# Patient Record
Sex: FEMALE | Race: WHITE | NOT HISPANIC OR LATINO | Employment: OTHER | URBAN - METROPOLITAN AREA
[De-identification: names, ages, dates, MRNs, and addresses within clinical notes are randomized per-mention and may not be internally consistent; named-entity substitution may affect disease eponyms.]

---

## 2019-02-04 RX ORDER — SIMVASTATIN 20 MG
20 TABLET ORAL NIGHTLY
Qty: 90 TABLET | Refills: 1 | Status: SHIPPED | OUTPATIENT
Start: 2019-02-04 | End: 2019-08-03 | Stop reason: SDUPTHER

## 2019-02-04 RX ORDER — GLIMEPIRIDE 4 MG/1
4 TABLET ORAL 2 TIMES DAILY
Qty: 180 TABLET | Refills: 1 | Status: SHIPPED | OUTPATIENT
Start: 2019-02-04 | End: 2019-08-03 | Stop reason: SDUPTHER

## 2019-02-04 RX ORDER — LISINOPRIL 10 MG/1
10 TABLET ORAL DAILY
Qty: 90 TABLET | Refills: 1 | Status: SHIPPED | OUTPATIENT
Start: 2019-02-04 | End: 2019-08-03 | Stop reason: SDUPTHER

## 2019-02-22 PROBLEM — M81.0 OSTEOPOROSIS: Status: ACTIVE | Noted: 2019-02-22

## 2019-02-22 PROBLEM — G62.9 PERIPHERAL NEUROPATHY: Status: ACTIVE | Noted: 2019-02-22

## 2019-02-22 PROBLEM — E78.2 MIXED HYPERLIPIDEMIA: Status: ACTIVE | Noted: 2019-02-22

## 2019-02-22 PROBLEM — E11.9 DIABETES MELLITUS, TYPE 2 (HCC): Status: ACTIVE | Noted: 2019-02-22

## 2019-02-22 PROBLEM — J30.9 ALLERGIC RHINITIS: Status: ACTIVE | Noted: 2019-02-22

## 2019-02-22 PROBLEM — E66.9 OBESITY: Status: ACTIVE | Noted: 2019-02-22

## 2019-02-22 PROBLEM — E55.9 VITAMIN D DEFICIENCY: Status: ACTIVE | Noted: 2019-02-22

## 2019-02-22 PROBLEM — G60.9 IDIOPATHIC PERIPHERAL NEUROPATHY: Status: ACTIVE | Noted: 2019-02-22

## 2019-02-27 ENCOUNTER — OFFICE VISIT (OUTPATIENT)
Dept: INTERNAL MEDICINE | Facility: CLINIC | Age: 64
End: 2019-02-27

## 2019-02-27 VITALS
HEIGHT: 60 IN | HEART RATE: 76 BPM | WEIGHT: 162 LBS | DIASTOLIC BLOOD PRESSURE: 68 MMHG | SYSTOLIC BLOOD PRESSURE: 118 MMHG | BODY MASS INDEX: 31.8 KG/M2

## 2019-02-27 DIAGNOSIS — E66.09 CLASS 1 OBESITY DUE TO EXCESS CALORIES WITH SERIOUS COMORBIDITY AND BODY MASS INDEX (BMI) OF 31.0 TO 31.9 IN ADULT: ICD-10-CM

## 2019-02-27 DIAGNOSIS — E78.2 MIXED HYPERLIPIDEMIA: ICD-10-CM

## 2019-02-27 DIAGNOSIS — E55.9 VITAMIN D DEFICIENCY: ICD-10-CM

## 2019-02-27 DIAGNOSIS — E11.42 TYPE 2 DIABETES MELLITUS WITH DIABETIC POLYNEUROPATHY, WITHOUT LONG-TERM CURRENT USE OF INSULIN (HCC): Primary | ICD-10-CM

## 2019-02-27 LAB
25(OH)D3 SERPL-MCNC: 31.1 NG/ML
ALBUMIN SERPL-MCNC: 4.89 G/DL (ref 3.2–4.8)
ALBUMIN/GLOB SERPL: 2.4 G/DL (ref 1.5–2.5)
ALP SERPL-CCNC: 96 U/L (ref 25–100)
ALT SERPL W P-5'-P-CCNC: 42 U/L (ref 7–40)
ANION GAP SERPL CALCULATED.3IONS-SCNC: 10 MMOL/L (ref 3–11)
ARTICHOKE IGE QN: 92 MG/DL (ref 0–130)
AST SERPL-CCNC: 30 U/L (ref 0–33)
BILIRUB SERPL-MCNC: 1 MG/DL (ref 0.3–1.2)
BUN BLD-MCNC: 16 MG/DL (ref 9–23)
BUN/CREAT SERPL: 20.8 (ref 7–25)
CALCIUM SPEC-SCNC: 9.7 MG/DL (ref 8.7–10.4)
CHLORIDE SERPL-SCNC: 99 MMOL/L (ref 99–109)
CHOLEST SERPL-MCNC: 191 MG/DL (ref 0–200)
CO2 SERPL-SCNC: 28 MMOL/L (ref 20–31)
CREAT BLD-MCNC: 0.77 MG/DL (ref 0.6–1.3)
GFR SERPL CREATININE-BSD FRML MDRD: 76 ML/MIN/1.73
GLOBULIN UR ELPH-MCNC: 2 GM/DL
GLUCOSE BLD-MCNC: 165 MG/DL (ref 70–100)
HBA1C MFR BLD: 7.2 % (ref 4.8–5.6)
HDLC SERPL-MCNC: 44 MG/DL (ref 40–60)
POTASSIUM BLD-SCNC: 4.6 MMOL/L (ref 3.5–5.5)
PROT SERPL-MCNC: 6.9 G/DL (ref 5.7–8.2)
SODIUM BLD-SCNC: 137 MMOL/L (ref 132–146)
TRIGL SERPL-MCNC: 269 MG/DL (ref 0–150)

## 2019-02-27 PROCEDURE — 36415 COLL VENOUS BLD VENIPUNCTURE: CPT | Performed by: INTERNAL MEDICINE

## 2019-02-27 PROCEDURE — 82306 VITAMIN D 25 HYDROXY: CPT | Performed by: INTERNAL MEDICINE

## 2019-02-27 PROCEDURE — 99214 OFFICE O/P EST MOD 30 MIN: CPT | Performed by: INTERNAL MEDICINE

## 2019-02-27 PROCEDURE — 80053 COMPREHEN METABOLIC PANEL: CPT | Performed by: INTERNAL MEDICINE

## 2019-02-27 PROCEDURE — 83036 HEMOGLOBIN GLYCOSYLATED A1C: CPT | Performed by: INTERNAL MEDICINE

## 2019-02-27 PROCEDURE — 80061 LIPID PANEL: CPT | Performed by: INTERNAL MEDICINE

## 2019-02-27 RX ORDER — OMEPRAZOLE 20 MG/1
20 CAPSULE, DELAYED RELEASE ORAL EVERY OTHER DAY
COMMUNITY

## 2019-02-27 RX ORDER — ERGOCALCIFEROL (VITAMIN D2) 50 MCG
1 CAPSULE ORAL DAILY
COMMUNITY

## 2019-02-27 RX ORDER — PHENOL 1.4 %
600 AEROSOL, SPRAY (ML) MUCOUS MEMBRANE DAILY
COMMUNITY

## 2019-02-27 RX ORDER — OMEGA-3S/DHA/EPA/FISH OIL 1000-1400
1 CAPSULE,DELAYED RELEASE (ENTERIC COATED) ORAL DAILY
COMMUNITY

## 2019-02-27 RX ORDER — GLUCOSAMINE/CHONDR SU A SOD 750-600 MG
1 TABLET ORAL DAILY
COMMUNITY

## 2019-02-27 RX ORDER — ASPIRIN 81 MG/1
81 TABLET ORAL DAILY
COMMUNITY

## 2019-02-27 RX ORDER — CETIRIZINE HYDROCHLORIDE 10 MG/1
10 TABLET ORAL DAILY
COMMUNITY

## 2019-02-27 RX ORDER — VITAMIN E 268 MG
400 CAPSULE ORAL DAILY
COMMUNITY

## 2019-02-27 NOTE — PROGRESS NOTES
[facility]     Rachel Dale  1955   [id number]      Patient Care Team:  Jer Richardson MD as PCP - General (Internal Medicine)    Chief Complaint::   Chief Complaint   Patient presents with   • Hyperlipidemia   • Allergic Rhinitis   • Vitamin D Deficiency   • Diabetes   • Osteoporosis        HPI  Ms Dale is now 63.  She comes in for follow-up of her diabetes, neuropathy, hyperlipidemia, and obesity.  Since January, except on rainy days, she has walked 30-50 minutes a day.  She feels better.  Not eating better.  She has retired now so she has more time.    Chronic Conditions:        Patient Active Problem List   Diagnosis   • Allergic rhinitis   • Diabetes mellitus, type 2 (CMS/[facility])   • Peripheral neuropathy   • Idiopathic peripheral neuropathy   • Mixed hyperlipidemia   • Obesity   • Osteoporosis   • Vitamin D deficiency        No past medical history on file.    Past Surgical History:   Procedure Laterality Date   • BREAST SURGERY      BREAST REDUCTION    • CHOLECYSTECTOMY  2009       Family History   Problem Relation Age of Onset   • Hypertension Mother    • Stroke Father    • Colon cancer Brother 74   • Breast cancer Maternal Aunt    • Coronary artery disease Maternal Grandfather        Social History     Socioeconomic History   • Marital status:      Spouse name: Not on file   • Number of children: Not on file   • Years of education: Not on file   • Highest education level: Not on file   Social Needs   • Financial resource strain: Not on file   • Food insecurity - worry: Not on file   • Food insecurity - inability: Not on file   • Transportation needs - medical: Not on file   • Transportation needs - non-medical: Not on file   Occupational History   • Not on file   Tobacco Use   • Smoking status: Never Smoker   Substance and Sexual Activity   • Alcohol use: Not on file   • Drug use: Not on file   • Sexual activity: Not on file   Other Topics Concern   • Not on file   Social  History Narrative   • Not on file       Allergies   Allergen Reactions   • Sulfa Antibiotics Other (See Comments)     OTHER         Current Outpatient Medications:   •  aspirin 81 MG EC tablet, Take 81 mg by mouth Daily., Disp: , Rfl:   •  calcium carbonate (OS-ALTAGRACIA) 600 MG tablet, Take 600 mg by mouth Daily., Disp: , Rfl:   •  cetirizine (zyrTEC) 10 MG tablet, Take 10 mg by mouth Daily., Disp: , Rfl:   •  glimepiride (AMARYL) 4 MG tablet, Take 1 tablet by mouth 2 (Two) Times a Day., Disp: 180 tablet, Rfl: 1  •  lisinopril (PRINIVIL,ZESTRIL) 10 MG tablet, Take 1 tablet by mouth Daily., Disp: 90 tablet, Rfl: 1  •  Lutein-Zeaxanthin 25-5 MG capsule, Take 1 capsule by mouth Daily., Disp: , Rfl:   •  metFORMIN (GLUCOPHAGE) 1000 MG tablet, Take 1 tablet by mouth 2 (Two) Times a Day With Meals., Disp: 180 tablet, Rfl: 1  •  Omega-3 Fatty Acids (FISH OIL ULTRA) 1400 MG capsule, Take 1 capsule by mouth Daily., Disp: , Rfl:   •  omeprazole (priLOSEC) 20 MG capsule, Take 20 mg by mouth Daily., Disp: , Rfl:   •  simvastatin (ZOCOR) 20 MG tablet, Take 1 tablet by mouth Every Night., Disp: 90 tablet, Rfl: 1  •  Vitamin D, Ergocalciferol, 2000 units capsule, Take 1 capsule by mouth Daily., Disp: , Rfl:   •  vitamin E 400 UNIT capsule, Take 400 Units by mouth Daily., Disp: , Rfl:     Review of Systems   Constitutional: Negative.  Negative for chills, fatigue and fever.   HENT: Negative for congestion, ear pain and sinus pressure.    Respiratory: Negative.  Negative for cough, chest tightness, shortness of breath and wheezing.    Cardiovascular: Negative.  Negative for chest pain and palpitations.   Gastrointestinal: Negative for abdominal pain, blood in stool, constipation and diarrhea.   Skin: Negative for color change.   Allergic/Immunologic: Negative for environmental allergies.   Neurological: Negative for dizziness, speech difficulty and headache.   Psychiatric/Behavioral: Negative for decreased concentration. The patient is  "not nervous/anxious.         Vital Signs  Vitals:    02/27/19 0819   BP: 118/68   BP Location: Left arm   Patient Position: Sitting   Cuff Size: Adult   Pulse: 76   Weight: 73.5 kg (162 lb)   Height: 152.4 cm (60\")       Physical Exam   Constitutional: She is oriented to person, place, and time. She appears well-developed and well-nourished. She appears overweight.   HENT:   Head: Normocephalic and atraumatic.   Cardiovascular: Normal rate, regular rhythm and normal heart sounds.   No murmur heard.  Pulmonary/Chest: Effort normal and breath sounds normal.   Neurological: She is alert and oriented to person, place, and time.   Psychiatric: She has a normal mood and affect.   Vitals reviewed.     Procedures      Assessment/Plan:    #1 diabetic neuropathy, A1c pending.  Foot exam was done in August 2018, eye exam was performed in May 2018.  I am very happy with her current exercise regimen, and only suggested that she try to make a small reduction in simple carbohydrates.  #2 hyperlipidemia results pending  #3 vitamin D deficiency results pending  #4 obesity, BMI today is 31.  Exercise is good, dietary adjustment as discussed above.    Plan of care reviewed with patient at the conclusion of today's visit. Education was provided regarding diagnosis, management, and any prescribed or recommended OTC medications.Patient verbalizes understanding of and agreement with management plan.         Jer Richardson MD           "

## 2019-08-05 RX ORDER — LISINOPRIL 10 MG/1
TABLET ORAL
Qty: 90 TABLET | Refills: 1 | Status: SHIPPED | OUTPATIENT
Start: 2019-08-05 | End: 2020-03-06

## 2019-08-05 RX ORDER — GLIMEPIRIDE 4 MG/1
TABLET ORAL
Qty: 180 TABLET | Refills: 1 | Status: SHIPPED | OUTPATIENT
Start: 2019-08-05 | End: 2020-02-14 | Stop reason: SDUPTHER

## 2019-08-05 RX ORDER — SIMVASTATIN 20 MG
TABLET ORAL
Qty: 90 TABLET | Refills: 1 | Status: SHIPPED | OUTPATIENT
Start: 2019-08-05 | End: 2019-11-20 | Stop reason: SDUPTHER

## 2019-08-29 ENCOUNTER — OFFICE VISIT (OUTPATIENT)
Dept: INTERNAL MEDICINE | Facility: CLINIC | Age: 64
End: 2019-08-29

## 2019-08-29 ENCOUNTER — LAB (OUTPATIENT)
Dept: LAB | Facility: HOSPITAL | Age: 64
End: 2019-08-29

## 2019-08-29 VITALS
WEIGHT: 157.75 LBS | SYSTOLIC BLOOD PRESSURE: 128 MMHG | HEIGHT: 60 IN | TEMPERATURE: 97.9 F | HEART RATE: 72 BPM | BODY MASS INDEX: 30.97 KG/M2 | DIASTOLIC BLOOD PRESSURE: 80 MMHG

## 2019-08-29 DIAGNOSIS — E55.9 VITAMIN D DEFICIENCY: ICD-10-CM

## 2019-08-29 DIAGNOSIS — E11.42 TYPE 2 DIABETES MELLITUS WITH DIABETIC POLYNEUROPATHY, WITHOUT LONG-TERM CURRENT USE OF INSULIN (HCC): ICD-10-CM

## 2019-08-29 DIAGNOSIS — I10 BENIGN ESSENTIAL HYPERTENSION: ICD-10-CM

## 2019-08-29 DIAGNOSIS — E66.09 CLASS 1 OBESITY DUE TO EXCESS CALORIES WITH SERIOUS COMORBIDITY AND BODY MASS INDEX (BMI) OF 31.0 TO 31.9 IN ADULT: ICD-10-CM

## 2019-08-29 DIAGNOSIS — E78.2 MIXED HYPERLIPIDEMIA: ICD-10-CM

## 2019-08-29 DIAGNOSIS — Z00.00 PREVENTATIVE HEALTH CARE: Primary | ICD-10-CM

## 2019-08-29 LAB
25(OH)D3 SERPL-MCNC: 38.2 NG/ML (ref 30–100)
ALBUMIN SERPL-MCNC: 5 G/DL (ref 3.5–5.2)
ALBUMIN UR-MCNC: <1.2 MG/DL
ALBUMIN/GLOB SERPL: 1.9 G/DL
ALP SERPL-CCNC: 79 U/L (ref 39–117)
ALT SERPL W P-5'-P-CCNC: 25 U/L (ref 1–33)
ANION GAP SERPL CALCULATED.3IONS-SCNC: 13.5 MMOL/L (ref 5–15)
AST SERPL-CCNC: 25 U/L (ref 1–32)
BASOPHILS # BLD AUTO: 0.04 10*3/MM3 (ref 0–0.2)
BASOPHILS NFR BLD AUTO: 0.5 % (ref 0–1.5)
BILIRUB SERPL-MCNC: 0.3 MG/DL (ref 0.2–1.2)
BUN BLD-MCNC: 14 MG/DL (ref 8–23)
BUN/CREAT SERPL: 17.9 (ref 7–25)
CALCIUM SPEC-SCNC: 10.2 MG/DL (ref 8.6–10.5)
CHLORIDE SERPL-SCNC: 97 MMOL/L (ref 98–107)
CHOLEST SERPL-MCNC: 170 MG/DL (ref 0–200)
CO2 SERPL-SCNC: 27.5 MMOL/L (ref 22–29)
CREAT BLD-MCNC: 0.78 MG/DL (ref 0.57–1)
CREAT UR-MCNC: 63.5 MG/DL
DEPRECATED RDW RBC AUTO: 44.9 FL (ref 37–54)
EOSINOPHIL # BLD AUTO: 0.2 10*3/MM3 (ref 0–0.4)
EOSINOPHIL NFR BLD AUTO: 2.4 % (ref 0.3–6.2)
ERYTHROCYTE [DISTWIDTH] IN BLOOD BY AUTOMATED COUNT: 13.5 % (ref 12.3–15.4)
GFR SERPL CREATININE-BSD FRML MDRD: 75 ML/MIN/1.73
GLOBULIN UR ELPH-MCNC: 2.6 GM/DL
GLUCOSE BLD-MCNC: 133 MG/DL (ref 65–99)
HBA1C MFR BLD: 6.9 % (ref 4.8–5.6)
HCT VFR BLD AUTO: 42.8 % (ref 34–46.6)
HDLC SERPL-MCNC: 50 MG/DL (ref 40–60)
HGB BLD-MCNC: 13.4 G/DL (ref 12–15.9)
IMM GRANULOCYTES # BLD AUTO: 0.03 10*3/MM3 (ref 0–0.05)
IMM GRANULOCYTES NFR BLD AUTO: 0.4 % (ref 0–0.5)
LDLC SERPL CALC-MCNC: 75 MG/DL (ref 0–100)
LDLC/HDLC SERPL: 1.5 {RATIO}
LYMPHOCYTES # BLD AUTO: 3.22 10*3/MM3 (ref 0.7–3.1)
LYMPHOCYTES NFR BLD AUTO: 39 % (ref 19.6–45.3)
MCH RBC QN AUTO: 28.2 PG (ref 26.6–33)
MCHC RBC AUTO-ENTMCNC: 31.3 G/DL (ref 31.5–35.7)
MCV RBC AUTO: 89.9 FL (ref 79–97)
MICROALBUMIN/CREAT UR: NORMAL MG/G{CREAT}
MONOCYTES # BLD AUTO: 0.55 10*3/MM3 (ref 0.1–0.9)
MONOCYTES NFR BLD AUTO: 6.7 % (ref 5–12)
NEUTROPHILS # BLD AUTO: 4.21 10*3/MM3 (ref 1.7–7)
NEUTROPHILS NFR BLD AUTO: 51 % (ref 42.7–76)
NRBC BLD AUTO-RTO: 0 /100 WBC (ref 0–0.2)
PLATELET # BLD AUTO: 355 10*3/MM3 (ref 140–450)
PMV BLD AUTO: 9.7 FL (ref 6–12)
POTASSIUM BLD-SCNC: 4.4 MMOL/L (ref 3.5–5.2)
PROT SERPL-MCNC: 7.6 G/DL (ref 6–8.5)
RBC # BLD AUTO: 4.76 10*6/MM3 (ref 3.77–5.28)
SODIUM BLD-SCNC: 138 MMOL/L (ref 136–145)
TRIGL SERPL-MCNC: 226 MG/DL (ref 0–150)
VLDLC SERPL-MCNC: 45.2 MG/DL (ref 5–40)
WBC NRBC COR # BLD: 8.25 10*3/MM3 (ref 3.4–10.8)

## 2019-08-29 PROCEDURE — 82570 ASSAY OF URINE CREATININE: CPT

## 2019-08-29 PROCEDURE — 85025 COMPLETE CBC W/AUTO DIFF WBC: CPT

## 2019-08-29 PROCEDURE — 82306 VITAMIN D 25 HYDROXY: CPT

## 2019-08-29 PROCEDURE — 80053 COMPREHEN METABOLIC PANEL: CPT

## 2019-08-29 PROCEDURE — 82043 UR ALBUMIN QUANTITATIVE: CPT

## 2019-08-29 PROCEDURE — 80061 LIPID PANEL: CPT

## 2019-08-29 PROCEDURE — 83036 HEMOGLOBIN GLYCOSYLATED A1C: CPT

## 2019-08-29 PROCEDURE — 99396 PREV VISIT EST AGE 40-64: CPT | Performed by: INTERNAL MEDICINE

## 2019-08-29 NOTE — PROGRESS NOTES
[facility]     Rachel Dale  1955   [id number]      Patient Care Team:  Jer Richardson MD as PCP - General (Internal Medicine)    Chief Complaint::   Chief Complaint   Patient presents with   • Annual Exam     fasting   • Diabetes   • Hyperlipidemia        HPI  Ms. Dale is now 64.  She comes in for follow-up of her diabetes, hyperlipidemia, hypertension vitamin D deficiency and for annual examination.  She is doing very well.  She has lost weight by reducing carbohydrates.  She walks 2 miles a day most days.  Her glucoses have ranged between 70 and 130.  She has had 1 or 2 episodes of hypoglycemia.  There is no chest pain or dyspnea.    Chronic Conditions:      Patient Active Problem List   Diagnosis   • Allergic rhinitis   • Diabetes mellitus, type 2 (CMS/HCC)   • Peripheral neuropathy   • Idiopathic peripheral neuropathy   • Mixed hyperlipidemia   • Obesity   • Osteoporosis   • Vitamin D deficiency        History reviewed. No pertinent past medical history.    Past Surgical History:   Procedure Laterality Date   • BREAST SURGERY      BREAST REDUCTION    • CHOLECYSTECTOMY  2009       Family History   Problem Relation Age of Onset   • Hypertension Mother    • Stroke Father    • Colon cancer Brother 74   • Breast cancer Maternal Aunt    • Coronary artery disease Maternal Grandfather        Social History     Socioeconomic History   • Marital status:      Spouse name: Not on file   • Number of children: Not on file   • Years of education: Not on file   • Highest education level: Not on file   Tobacco Use   • Smoking status: Never Smoker   • Smokeless tobacco: Never Used   Substance and Sexual Activity   • Alcohol use: Yes     Alcohol/week: 0.6 oz     Types: 1 Glasses of wine per week     Frequency: Never     Comment: yearly   • Drug use: Defer   • Sexual activity: Defer       Allergies   Allergen Reactions   • Sulfa Antibiotics Other (See Comments)     OTHER         Current  Outpatient Medications:   •  aspirin 81 MG EC tablet, Take 81 mg by mouth Daily., Disp: , Rfl:   •  calcium carbonate (OS-ALTAGRACIA) 600 MG tablet, Take 600 mg by mouth Daily., Disp: , Rfl:   •  cetirizine (zyrTEC) 10 MG tablet, Take 10 mg by mouth Daily., Disp: , Rfl:   •  glimepiride (AMARYL) 4 MG tablet, TAKE 1 TABLET TWICE A DAY, Disp: 180 tablet, Rfl: 1  •  lisinopril (PRINIVIL,ZESTRIL) 10 MG tablet, TAKE 1 TABLET DAILY, Disp: 90 tablet, Rfl: 1  •  Lutein-Zeaxanthin 25-5 MG capsule, Take 1 capsule by mouth Daily., Disp: , Rfl:   •  metFORMIN (GLUCOPHAGE) 1000 MG tablet, TAKE 1 TABLET TWICE A DAY WITH MEALS, Disp: 180 tablet, Rfl: 1  •  Omega-3 Fatty Acids (FISH OIL ULTRA) 1400 MG capsule, Take 1 capsule by mouth Daily., Disp: , Rfl:   •  omeprazole (priLOSEC) 20 MG capsule, Take 20 mg by mouth Daily., Disp: , Rfl:   •  simvastatin (ZOCOR) 20 MG tablet, TAKE 1 TABLET EVERY NIGHT, Disp: 90 tablet, Rfl: 1  •  Vitamin D, Ergocalciferol, 2000 units capsule, Take 1 capsule by mouth Daily., Disp: , Rfl:   •  vitamin E 400 UNIT capsule, Take 400 Units by mouth Daily., Disp: , Rfl:     Immunization History   Administered Date(s) Administered   • Tdap 03/25/2009        Health Maintenance Due   Topic Date Due   • MAMMOGRAM  1955   • URINE MICROALBUMIN  1955   • PNEUMOCOCCAL VACCINE (19-64 MEDIUM RISK) (1 of 1 - PPSV23) 04/27/1976   • ZOSTER VACCINE (1 of 2) 04/27/2007   • HEPATITIS C SCREENING  02/01/2019   • DIABETIC FOOT EXAM  02/01/2019   • PAP SMEAR  02/01/2019   • DXA SCAN  02/27/2019   • TDAP/TD VACCINES (2 - Td) 03/25/2019   • ANNUAL PHYSICAL  08/21/2019   • HEMOGLOBIN A1C  08/27/2019   • INFLUENZA VACCINE  08/01/2019        Review of Systems   Constitutional: Negative for chills, fatigue and fever.   HENT: Positive for sinus pressure. Negative for congestion and ear pain.    Respiratory: Negative for cough, chest tightness, shortness of breath and wheezing.    Cardiovascular: Negative for chest pain and  "palpitations.   Gastrointestinal: Negative for abdominal pain, blood in stool and constipation.   Skin: Negative for color change.   Allergic/Immunologic: Negative for environmental allergies.   Neurological: Positive for headache. Negative for dizziness and speech difficulty.   Psychiatric/Behavioral: Negative for decreased concentration. The patient is not nervous/anxious.         Vital Signs  Vitals:    08/29/19 0804   BP: 128/80   BP Location: Left arm   Patient Position: Sitting   Cuff Size: Adult   Pulse: 72   Temp: 97.9 °F (36.6 °C)   TempSrc: Temporal   Weight: 71.6 kg (157 lb 12 oz)   Height: 151.5 cm (59.65\")   PainSc: 0-No pain       Physical Exam   Constitutional: She is oriented to person, place, and time. She appears well-developed and well-nourished.   HENT:   Head: Normocephalic and atraumatic.   Right Ear: External ear normal.   Left Ear: External ear normal.   Nose: Nose normal.   Mouth/Throat: Oropharynx is clear and moist. No oropharyngeal exudate.   Eyes: Conjunctivae and EOM are normal. Pupils are equal, round, and reactive to light.   Neck: Normal range of motion. Neck supple. No JVD present. No thyromegaly present.   Cardiovascular: Normal rate, regular rhythm, normal heart sounds and intact distal pulses. Exam reveals no gallop and no friction rub.   No murmur heard.  Pulmonary/Chest: Effort normal and breath sounds normal. No respiratory distress. She has no wheezes. She has no rales. She exhibits no tenderness.   Abdominal: Soft. Bowel sounds are normal. She exhibits no distension and no mass. There is no tenderness. There is no rebound and no guarding. No hernia.   Musculoskeletal: Normal range of motion. She exhibits no edema or tenderness.    Rachel had a diabetic foot exam performed today.   During the foot exam she had a monofilament test performed (Sensation intact in both feet to monofilament).  Vascular Status -  Her right foot exhibits normal foot vasculature  and no edema. Her " left foot exhibits normal foot vasculature  and no edema.  Skin Integrity  -  Her right foot skin is intact.Her left foot skin is intact..  Lymphadenopathy:     She has no cervical adenopathy.   Neurological: She is alert and oriented to person, place, and time. She displays normal reflexes. No cranial nerve deficit or sensory deficit. She exhibits normal muscle tone. Coordination normal.   Skin: Skin is warm and dry. No rash noted. No erythema.   Psychiatric: She has a normal mood and affect. Her behavior is normal. Judgment and thought content normal.   Nursing note and vitals reviewed.     Procedures     Fall Risk Screen:  STEADI Fall Risk Assessment has not been completed.    Health Habits and Functional and Cognitive Screening:  No flowsheet data found.    Depression [place]  PHQ-9 Total Score: 0     ACE III MINI             Assessment/Plan:    Rachel was seen today for annual exam, diabetes and hyperlipidemia.    Diagnoses and all orders for this visit:    Preventative health care    Benign essential hypertension  -     CBC & Differential; Future  -     Comprehensive Metabolic Panel; Future    Type 2 diabetes mellitus with diabetic polyneuropathy, without long-term current use of insulin (CMS/[facility])  -     Comprehensive Metabolic Panel; Future  -     Hemoglobin A1c; Future  -     Microalbumin / Creatinine Urine Ratio - Urine, Clean Catch; Future    Class 1 obesity due to excess calories with serious comorbidity and body mass index (BMI) of 31.0 to 31.9 in adult    Vitamin D deficiency  -     Vitamin D 25 Hydroxy; Future    Mixed hyperlipidemia  -     Lipid Panel; Future    Plan    She is taking much better care of herself with good exercise and dietary habits having lost 5 pounds.  I have encouraged her to keep that up.  Colonoscopy was performed 9/20/2017, follow-up at 5 years.  Mammography was performed last week.  She goes yearly to  for cervical cancer screening.    Blood pressure is controlled on  lisinopril.    A1c is pending.  She will continue glimepiride and metformin.  If she has further hypoglycemic episodes would reduce and possibly discontinue glimepiride as she loses weight.  Eye exam was done in June foot exam was performed today.    BMI is 31, but again she is down 5 pounds.  Encouraged her to keep up the good work.    Lipid panel is pending, continue statin.        Labs  Results for orders placed or performed in visit on 02/27/19   Hemoglobin A1c   Result Value Ref Range    Hemoglobin A1C 7.20 (H) 4.80 - 5.60 %   Comprehensive Metabolic Panel   Result Value Ref Range    Glucose 165 (H) 70 - 100 mg/dL    BUN 16 9 - 23 mg/dL    Creatinine 0.77 0.60 - 1.30 mg/dL    Sodium 137 132 - 146 mmol/L    Potassium 4.6 3.5 - 5.5 mmol/L    Chloride 99 99 - 109 mmol/L    CO2 28.0 20.0 - 31.0 mmol/L    Calcium 9.7 8.7 - 10.4 mg/dL    Total Protein 6.9 5.7 - 8.2 g/dL    Albumin 4.89 (H) 3.20 - 4.80 g/dL    ALT (SGPT) 42 (H) 7 - 40 U/L    AST (SGOT) 30 0 - 33 U/L    Alkaline Phosphatase 96 25 - 100 U/L    Total Bilirubin 1.0 0.3 - 1.2 mg/dL    eGFR Non African Amer 76 >60 mL/min/1.73    Globulin 2.0 gm/dL    A/G Ratio 2.4 1.5 - 2.5 g/dL    BUN/Creatinine Ratio 20.8 7.0 - 25.0    Anion Gap 10.0 3.0 - 11.0 mmol/L   Lipid Panel   Result Value Ref Range    Total Cholesterol 191 0 - 200 mg/dL    Triglycerides 269 (H) 0 - 150 mg/dL    HDL Cholesterol 44 40 - 60 mg/dL    LDL Cholesterol  92 0 - 130 mg/dL   Vitamin D 25 Hydroxy   Result Value Ref Range    25 Hydroxy, Vitamin D 31.1 ng/ml        Counseling was given to patient for the following topics: appropriate exercise he is doing, disease prevention and healthy eating habits.    Plan of care reviewed with patient at the conclusion of today's visit. Education was provided regarding diagnosis, management, and any prescribed or recommended OTC medications.Patient verbalizes understanding of and agreement with management plan.         Jer Richardson MD

## 2019-11-20 RX ORDER — SIMVASTATIN 20 MG
TABLET ORAL
Qty: 30 TABLET | Refills: 5 | Status: SHIPPED | OUTPATIENT
Start: 2019-11-20 | End: 2020-03-06 | Stop reason: SDUPTHER

## 2020-02-14 RX ORDER — GLIMEPIRIDE 4 MG/1
4 TABLET ORAL 2 TIMES DAILY
Qty: 180 TABLET | Refills: 1 | Status: SHIPPED | OUTPATIENT
Start: 2020-02-14 | End: 2020-03-06 | Stop reason: SDUPTHER

## 2020-02-14 NOTE — TELEPHONE ENCOUNTER
PHARMACY CALLED IN REQUESTING A REFILL ON [facility] FOR PT. glimepiride (AMARYL) 4 MG tablet.      [facility] NOLBERTO AGUILAR CONFIRMED

## 2020-03-06 ENCOUNTER — LAB (OUTPATIENT)
Dept: LAB | Facility: HOSPITAL | Age: 65
End: 2020-03-06

## 2020-03-06 ENCOUNTER — OFFICE VISIT (OUTPATIENT)
Dept: INTERNAL MEDICINE | Facility: CLINIC | Age: 65
End: 2020-03-06

## 2020-03-06 VITALS
HEART RATE: 84 BPM | WEIGHT: 160 LBS | SYSTOLIC BLOOD PRESSURE: 140 MMHG | DIASTOLIC BLOOD PRESSURE: 82 MMHG | BODY MASS INDEX: 31.41 KG/M2 | HEIGHT: 60 IN

## 2020-03-06 DIAGNOSIS — E55.9 VITAMIN D DEFICIENCY: ICD-10-CM

## 2020-03-06 DIAGNOSIS — E11.42 TYPE 2 DIABETES MELLITUS WITH DIABETIC POLYNEUROPATHY, WITHOUT LONG-TERM CURRENT USE OF INSULIN (HCC): ICD-10-CM

## 2020-03-06 DIAGNOSIS — E11.42 TYPE 2 DIABETES MELLITUS WITH DIABETIC POLYNEUROPATHY, WITHOUT LONG-TERM CURRENT USE OF INSULIN (HCC): Primary | ICD-10-CM

## 2020-03-06 DIAGNOSIS — E78.2 MIXED HYPERLIPIDEMIA: ICD-10-CM

## 2020-03-06 LAB
25(OH)D3 SERPL-MCNC: 46.4 NG/ML (ref 30–100)
ALBUMIN SERPL-MCNC: 4.7 G/DL (ref 3.5–5.2)
ALBUMIN/GLOB SERPL: 1.5 G/DL
ALP SERPL-CCNC: 83 U/L (ref 39–117)
ALT SERPL W P-5'-P-CCNC: 19 U/L (ref 1–33)
ANION GAP SERPL CALCULATED.3IONS-SCNC: 15.6 MMOL/L (ref 5–15)
AST SERPL-CCNC: 19 U/L (ref 1–32)
BILIRUB SERPL-MCNC: 0.3 MG/DL (ref 0.2–1.2)
BUN BLD-MCNC: 12 MG/DL (ref 8–23)
BUN/CREAT SERPL: 16 (ref 7–25)
CALCIUM SPEC-SCNC: 10.2 MG/DL (ref 8.6–10.5)
CHLORIDE SERPL-SCNC: 97 MMOL/L (ref 98–107)
CHOLEST SERPL-MCNC: 190 MG/DL (ref 0–200)
CO2 SERPL-SCNC: 26.4 MMOL/L (ref 22–29)
CREAT BLD-MCNC: 0.75 MG/DL (ref 0.57–1)
GFR SERPL CREATININE-BSD FRML MDRD: 78 ML/MIN/1.73
GLOBULIN UR ELPH-MCNC: 3.2 GM/DL
GLUCOSE BLD-MCNC: 164 MG/DL (ref 65–99)
HBA1C MFR BLD: 8 % (ref 4.8–5.6)
HDLC SERPL-MCNC: 47 MG/DL (ref 40–60)
LDLC SERPL CALC-MCNC: 77 MG/DL (ref 0–100)
LDLC/HDLC SERPL: 1.63 {RATIO}
POTASSIUM BLD-SCNC: 4.6 MMOL/L (ref 3.5–5.2)
PROT SERPL-MCNC: 7.9 G/DL (ref 6–8.5)
SODIUM BLD-SCNC: 139 MMOL/L (ref 136–145)
TRIGL SERPL-MCNC: 332 MG/DL (ref 0–150)
VLDLC SERPL-MCNC: 66.4 MG/DL (ref 5–40)

## 2020-03-06 PROCEDURE — 80053 COMPREHEN METABOLIC PANEL: CPT

## 2020-03-06 PROCEDURE — 82306 VITAMIN D 25 HYDROXY: CPT

## 2020-03-06 PROCEDURE — 99214 OFFICE O/P EST MOD 30 MIN: CPT | Performed by: INTERNAL MEDICINE

## 2020-03-06 PROCEDURE — 83036 HEMOGLOBIN GLYCOSYLATED A1C: CPT

## 2020-03-06 PROCEDURE — 80061 LIPID PANEL: CPT

## 2020-03-06 RX ORDER — SIMVASTATIN 20 MG
20 TABLET ORAL EVERY EVENING
Qty: 90 TABLET | Refills: 3 | Status: SHIPPED | OUTPATIENT
Start: 2020-03-06 | End: 2021-02-19

## 2020-03-06 RX ORDER — GLIMEPIRIDE 4 MG/1
4 TABLET ORAL 2 TIMES DAILY
Qty: 180 TABLET | Refills: 1 | Status: SHIPPED | OUTPATIENT
Start: 2020-03-06 | End: 2020-08-28

## 2020-03-06 RX ORDER — LISINOPRIL 20 MG/1
20 TABLET ORAL DAILY
Qty: 90 TABLET | Refills: 3 | Status: SHIPPED | OUTPATIENT
Start: 2020-03-06 | End: 2021-02-11

## 2020-03-06 NOTE — PROGRESS NOTES
[facility]     Rachel Dale  1955   [id number]      Patient Care Team:  Jer Richardson MD as PCP - General (Internal Medicine)    Chief Complaint::   Chief Complaint   Patient presents with   • Hyperlipidemia   • Diabetes        HPI  Ms. Dale comes in for follow-up of her diabetes, hyperlipidemia and vitamin D deficiency.  She admits that she gained weight over the holidays and has not yet resumed her appropriate diet.  Despite that she feels well.  She has no chest pain or dyspnea.  She denies lower extremity paresthesias.    Chronic Conditions:      Patient Active Problem List   Diagnosis   • Allergic rhinitis   • Diabetes mellitus, type 2 (CMS/[facility])   • Peripheral neuropathy   • Idiopathic peripheral neuropathy   • Mixed hyperlipidemia   • Obesity   • Osteoporosis   • Vitamin D deficiency        No past medical history on file.    Past Surgical History:   Procedure Laterality Date   • BREAST SURGERY      BREAST REDUCTION    • CHOLECYSTECTOMY  2009       Family History   Problem Relation Age of Onset   • Hypertension Mother    • Stroke Father    • Colon cancer Brother 74   • Breast cancer Maternal Aunt    • Coronary artery disease Maternal Grandfather        Social History     Socioeconomic History   • Marital status:      Spouse name: Not on file   • Number of children: Not on file   • Years of education: Not on file   • Highest education level: Not on file   Tobacco Use   • Smoking status: Never Smoker   • Smokeless tobacco: Never Used   Substance and Sexual Activity   • Alcohol use: Yes     Alcohol/week: 1.0 standard drinks     Types: 1 Glasses of wine per week     Frequency: Never     Comment: yearly   • Drug use: Defer   • Sexual activity: Defer       Allergies   Allergen Reactions   • Sulfa Antibiotics Other (See Comments)     OTHER         Current Outpatient Medications:   •  aspirin 81 MG EC tablet, Take 81 mg by mouth Daily., Disp: , Rfl:   •  calcium carbonate  "(OS-ALTAGRACIA) 600 MG tablet, Take 600 mg by mouth Daily., Disp: , Rfl:   •  cetirizine (zyrTEC) 10 MG tablet, Take 10 mg by mouth Daily., Disp: , Rfl:   •  Lutein-Zeaxanthin 25-5 MG capsule, Take 1 capsule by mouth Daily., Disp: , Rfl:   •  metFORMIN (GLUCOPHAGE) 1000 MG tablet, Take 1 tablet by mouth 2 (Two) Times a Day With Meals., Disp: 180 tablet, Rfl: 3  •  Omega-3 Fatty Acids (FISH OIL ULTRA) 1400 MG capsule, Take 1 capsule by mouth Daily., Disp: , Rfl:   •  omeprazole (priLOSEC) 20 MG capsule, Take 20 mg by mouth Daily., Disp: , Rfl:   •  simvastatin (ZOCOR) 20 MG tablet, Take 1 tablet by mouth Every Evening., Disp: 90 tablet, Rfl: 3  •  Vitamin D, Ergocalciferol, 2000 units capsule, Take 1 capsule by mouth Daily., Disp: , Rfl:   •  vitamin E 400 UNIT capsule, Take 400 Units by mouth Daily., Disp: , Rfl:   •  glimepiride (AMARYL) 4 MG tablet, Take 1 tablet by mouth 2 (Two) Times a Day., Disp: 180 tablet, Rfl: 1  •  lisinopril (PRINIVIL,ZESTRIL) 20 MG tablet, Take 1 tablet by mouth Daily., Disp: 90 tablet, Rfl: 3    Review of Systems   Constitutional: Negative for chills, fatigue and fever.   HENT: Positive for sinus pressure. Negative for congestion and ear pain.    Respiratory: Negative for cough, chest tightness, shortness of breath and wheezing.    Cardiovascular: Negative for chest pain and palpitations.   Gastrointestinal: Negative for abdominal pain, blood in stool and constipation.   Skin: Negative for color change.   Allergic/Immunologic: Negative for environmental allergies.   Neurological: Positive for headache. Negative for dizziness and speech difficulty.   Psychiatric/Behavioral: Negative for decreased concentration. The patient is not nervous/anxious.         Vital Signs  Vitals:    03/06/20 0751   BP: 140/82   BP Location: Left arm   Patient Position: Sitting   Cuff Size: Adult   Pulse: 84   Weight: 72.6 kg (160 lb)   Height: 151.5 cm (59.65\")   PainSc:   4   PainLoc: Head       Physical Exam "   Constitutional: She is oriented to person, place, and time. She appears well-developed and well-nourished.   HENT:   Head: Normocephalic and atraumatic.   Cardiovascular: Normal rate, regular rhythm and normal heart sounds.   No murmur heard.  Pulmonary/Chest: Effort normal and breath sounds normal.   Neurological: She is alert and oriented to person, place, and time.   Psychiatric: She has a normal mood and affect.   Vitals reviewed.     Procedures    ACE III MINI             Assessment/Plan:    Rachel was seen today for hyperlipidemia and diabetes.    Diagnoses and all orders for this visit:    Type 2 diabetes mellitus with diabetic polyneuropathy, without long-term current use of insulin (CMS/[facility])  -     Hemoglobin A1c; Future  -     Comprehensive Metabolic Panel; Future    Mixed hyperlipidemia  -     Comprehensive Metabolic Panel; Future  -     Lipid Panel; Future    Vitamin D deficiency  -     Vitamin D 25 Hydroxy; Future    Other orders  -     simvastatin (ZOCOR) 20 MG tablet; Take 1 tablet by mouth Every Evening.  -     metFORMIN (GLUCOPHAGE) 1000 MG tablet; Take 1 tablet by mouth 2 (Two) Times a Day With Meals.  -     lisinopril (PRINIVIL,ZESTRIL) 20 MG tablet; Take 1 tablet by mouth Daily.    Plan    A1c is pending, encouraged her to get her weight back down by reducing simple carbohydrates in her diet.  She will continue metformin and glimepiride.    Lipid panel is pending, continue healthy diet, regular exercise and simvastatin.    Vitamin D level is pending, continue 400 units of vitamin D daily.      Plan of care reviewed with patient at the conclusion of today's visit. Education was provided regarding diagnosis, management, and any prescribed or recommended OTC medications.Patient verbalizes understanding of and agreement with management plan.         Jer Richardson MD

## 2020-04-03 NOTE — TELEPHONE ENCOUNTER
Pt says she is to start taking Jardiance and the order was sent to [facility].  Pt says with her insurance it would be $500.  Pt is unable to afford that.  Pt is requesting it be resent to [facility] and wait until they contact her to see if she can afford it.

## 2020-08-28 RX ORDER — GLIMEPIRIDE 4 MG/1
TABLET ORAL
Qty: 180 TABLET | Refills: 3 | Status: SHIPPED | OUTPATIENT
Start: 2020-08-28 | End: 2021-08-31

## 2020-09-10 ENCOUNTER — OFFICE VISIT (OUTPATIENT)
Dept: INTERNAL MEDICINE | Facility: CLINIC | Age: 65
End: 2020-09-10

## 2020-09-10 ENCOUNTER — LAB (OUTPATIENT)
Dept: LAB | Facility: HOSPITAL | Age: 65
End: 2020-09-10

## 2020-09-10 VITALS
HEIGHT: 60 IN | BODY MASS INDEX: 30.27 KG/M2 | TEMPERATURE: 97.3 F | WEIGHT: 154.2 LBS | DIASTOLIC BLOOD PRESSURE: 80 MMHG | HEART RATE: 76 BPM | SYSTOLIC BLOOD PRESSURE: 128 MMHG

## 2020-09-10 DIAGNOSIS — Z00.00 PREVENTATIVE HEALTH CARE: Primary | ICD-10-CM

## 2020-09-10 DIAGNOSIS — E11.42 TYPE 2 DIABETES MELLITUS WITH DIABETIC POLYNEUROPATHY, WITHOUT LONG-TERM CURRENT USE OF INSULIN (HCC): ICD-10-CM

## 2020-09-10 DIAGNOSIS — E55.9 VITAMIN D DEFICIENCY: ICD-10-CM

## 2020-09-10 DIAGNOSIS — E78.2 MIXED HYPERLIPIDEMIA: ICD-10-CM

## 2020-09-10 DIAGNOSIS — Z13.0 SCREENING FOR DEFICIENCY ANEMIA: ICD-10-CM

## 2020-09-10 LAB
25(OH)D3 SERPL-MCNC: 59.2 NG/ML (ref 30–100)
ALBUMIN SERPL-MCNC: 4.6 G/DL (ref 3.5–5.2)
ALBUMIN UR-MCNC: <1.2 MG/DL
ALBUMIN/GLOB SERPL: 1.6 G/DL
ALP SERPL-CCNC: 73 U/L (ref 39–117)
ALT SERPL W P-5'-P-CCNC: 14 U/L (ref 1–33)
ANION GAP SERPL CALCULATED.3IONS-SCNC: 13.3 MMOL/L (ref 5–15)
AST SERPL-CCNC: 13 U/L (ref 1–32)
BASOPHILS # BLD AUTO: 0.06 10*3/MM3 (ref 0–0.2)
BASOPHILS NFR BLD AUTO: 0.6 % (ref 0–1.5)
BILIRUB SERPL-MCNC: 0.4 MG/DL (ref 0–1.2)
BUN SERPL-MCNC: 14 MG/DL (ref 8–23)
BUN/CREAT SERPL: 19.2 (ref 7–25)
CALCIUM SPEC-SCNC: 10.5 MG/DL (ref 8.6–10.5)
CHLORIDE SERPL-SCNC: 95 MMOL/L (ref 98–107)
CHOLEST SERPL-MCNC: 188 MG/DL (ref 0–200)
CO2 SERPL-SCNC: 28.7 MMOL/L (ref 22–29)
CREAT SERPL-MCNC: 0.73 MG/DL (ref 0.57–1)
CREAT UR-MCNC: 79.2 MG/DL
DEPRECATED RDW RBC AUTO: 40.5 FL (ref 37–54)
EOSINOPHIL # BLD AUTO: 0.18 10*3/MM3 (ref 0–0.4)
EOSINOPHIL NFR BLD AUTO: 1.9 % (ref 0.3–6.2)
ERYTHROCYTE [DISTWIDTH] IN BLOOD BY AUTOMATED COUNT: 13.1 % (ref 12.3–15.4)
GFR SERPL CREATININE-BSD FRML MDRD: 81 ML/MIN/1.73
GLOBULIN UR ELPH-MCNC: 2.9 GM/DL
GLUCOSE SERPL-MCNC: 137 MG/DL (ref 65–99)
HBA1C MFR BLD: 7.28 % (ref 4.8–5.6)
HCT VFR BLD AUTO: 39 % (ref 34–46.6)
HDLC SERPL-MCNC: 50 MG/DL (ref 40–60)
HGB BLD-MCNC: 13 G/DL (ref 12–15.9)
IMM GRANULOCYTES # BLD AUTO: 0.04 10*3/MM3 (ref 0–0.05)
IMM GRANULOCYTES NFR BLD AUTO: 0.4 % (ref 0–0.5)
LDLC SERPL CALC-MCNC: 70 MG/DL (ref 0–100)
LDLC/HDLC SERPL: 1.4 {RATIO}
LYMPHOCYTES # BLD AUTO: 3.86 10*3/MM3 (ref 0.7–3.1)
LYMPHOCYTES NFR BLD AUTO: 40.4 % (ref 19.6–45.3)
MCH RBC QN AUTO: 28.4 PG (ref 26.6–33)
MCHC RBC AUTO-ENTMCNC: 33.3 G/DL (ref 31.5–35.7)
MCV RBC AUTO: 85.3 FL (ref 79–97)
MICROALBUMIN/CREAT UR: NORMAL MG/G{CREAT}
MONOCYTES # BLD AUTO: 0.71 10*3/MM3 (ref 0.1–0.9)
MONOCYTES NFR BLD AUTO: 7.4 % (ref 5–12)
NEUTROPHILS NFR BLD AUTO: 4.71 10*3/MM3 (ref 1.7–7)
NEUTROPHILS NFR BLD AUTO: 49.3 % (ref 42.7–76)
NRBC BLD AUTO-RTO: 0 /100 WBC (ref 0–0.2)
PLATELET # BLD AUTO: 423 10*3/MM3 (ref 140–450)
PMV BLD AUTO: 9.3 FL (ref 6–12)
POTASSIUM SERPL-SCNC: 4.1 MMOL/L (ref 3.5–5.2)
PROT SERPL-MCNC: 7.5 G/DL (ref 6–8.5)
RBC # BLD AUTO: 4.57 10*6/MM3 (ref 3.77–5.28)
SODIUM SERPL-SCNC: 137 MMOL/L (ref 136–145)
TRIGL SERPL-MCNC: 339 MG/DL (ref 0–150)
VLDLC SERPL-MCNC: 67.8 MG/DL (ref 5–40)
WBC # BLD AUTO: 9.56 10*3/MM3 (ref 3.4–10.8)

## 2020-09-10 PROCEDURE — 82570 ASSAY OF URINE CREATININE: CPT

## 2020-09-10 PROCEDURE — 80061 LIPID PANEL: CPT

## 2020-09-10 PROCEDURE — 85025 COMPLETE CBC W/AUTO DIFF WBC: CPT

## 2020-09-10 PROCEDURE — 82043 UR ALBUMIN QUANTITATIVE: CPT

## 2020-09-10 PROCEDURE — 83036 HEMOGLOBIN GLYCOSYLATED A1C: CPT

## 2020-09-10 PROCEDURE — 80053 COMPREHEN METABOLIC PANEL: CPT

## 2020-09-10 PROCEDURE — 82306 VITAMIN D 25 HYDROXY: CPT

## 2020-09-10 PROCEDURE — 99396 PREV VISIT EST AGE 40-64: CPT | Performed by: INTERNAL MEDICINE

## 2020-09-10 NOTE — PROGRESS NOTES
[facility]     Rachel Dale  1955   [id number]      Patient Care Team:  Jer Richardson MD as PCP - General (Internal Medicine)    Chief Complaint::   Chief Complaint   Patient presents with   • Annual Exam        HPI  Ms. Dale is now 65.  She comes in for follow-up of her diabetes, hyperlipidemia, vitamin D deficiency and for annual examination.  She feels well.  She has lost 6 pounds since her last visit.  Last time her A1c was and we added Jardiance but it was too expensive.  She decided to lose weight instead.  Her strength and stamina are good.  She is active during the day although she does not exercise.  She tries to walk occasionally she has been retired now for 6 years.  There is no fever, cough, shortness of breath or chest pain.    Chronic Conditions:      Patient Active Problem List   Diagnosis   • Allergic rhinitis   • Diabetes mellitus, type 2 (CMS/HCC)   • Peripheral neuropathy   • Idiopathic peripheral neuropathy   • Mixed hyperlipidemia   • Obesity   • Osteoporosis   • Vitamin D deficiency        No past medical history on file.    Past Surgical History:   Procedure Laterality Date   • BREAST SURGERY      BREAST REDUCTION    • CHOLECYSTECTOMY  2009       Family History   Problem Relation Age of Onset   • Hypertension Mother    • Stroke Father    • Colon cancer Brother 74   • Breast cancer Maternal Aunt    • Coronary artery disease Maternal Grandfather        Social History     Socioeconomic History   • Marital status:      Spouse name: Not on file   • Number of children: Not on file   • Years of education: Not on file   • Highest education level: Not on file   Tobacco Use   • Smoking status: Never Smoker   • Smokeless tobacco: Never Used   Substance and Sexual Activity   • Alcohol use: Yes     Alcohol/week: 1.0 standard drinks     Types: 1 Glasses of wine per week     Frequency: Never     Comment: yearly   • Drug use: Defer   • Sexual activity: Defer        Allergies   Allergen Reactions   • Sulfa Antibiotics Other (See Comments)     OTHER         Current Outpatient Medications:   •  Acetaminophen (TYLENOL ARTHRITIS PAIN PO), Take 2 tablets by mouth Daily., Disp: , Rfl:   •  aspirin 81 MG EC tablet, Take 81 mg by mouth Daily., Disp: , Rfl:   •  calcium carbonate (OS-ALTAGRACIA) 600 MG tablet, Take 600 mg by mouth Daily., Disp: , Rfl:   •  cetirizine (zyrTEC) 10 MG tablet, Take 10 mg by mouth Daily., Disp: , Rfl:   •  glimepiride (AMARYL) 4 MG tablet, TAKE 1 TABLET TWICE A DAY, Disp: 180 tablet, Rfl: 3  •  lisinopril (PRINIVIL,ZESTRIL) 20 MG tablet, Take 1 tablet by mouth Daily., Disp: 90 tablet, Rfl: 3  •  Lutein-Zeaxanthin 25-5 MG capsule, Take 1 capsule by mouth Daily., Disp: , Rfl:   •  metFORMIN (GLUCOPHAGE) 1000 MG tablet, Take 1 tablet by mouth 2 (Two) Times a Day With Meals., Disp: 180 tablet, Rfl: 3  •  Omega-3 Fatty Acids (FISH OIL ULTRA) 1400 MG capsule, Take 1 capsule by mouth Daily., Disp: , Rfl:   •  omeprazole (priLOSEC) 20 MG capsule, Take 20 mg by mouth Daily., Disp: , Rfl:   •  simvastatin (ZOCOR) 20 MG tablet, Take 1 tablet by mouth Every Evening., Disp: 90 tablet, Rfl: 3  •  Vitamin D, Ergocalciferol, 2000 units capsule, Take 1 capsule by mouth Daily., Disp: , Rfl:   •  vitamin E 400 UNIT capsule, Take 400 Units by mouth Daily., Disp: , Rfl:     Immunization History   Administered Date(s) Administered   • Flu Vaccine Quad PF >18YRS 09/06/2019   • Flulaval/Fluarix Quad 08/31/2020   • Shingrix 08/31/2020   • Tdap 03/25/2009        Health Maintenance Due   Topic Date Due   • ZOSTER VACCINE (1 of 2) 04/27/2007   • HEPATITIS C SCREENING  02/01/2019   • PAP SMEAR  02/01/2019   • DXA SCAN  02/27/2019   • TDAP/TD VACCINES (2 - Td) 03/25/2019   • INFLUENZA VACCINE  08/01/2020   • DIABETIC FOOT EXAM  08/29/2020   • URINE MICROALBUMIN  08/29/2020   • ANNUAL PHYSICAL  08/30/2020   • HEMOGLOBIN A1C  09/06/2020        Review of Systems   Constitutional: Negative  "for chills, fatigue and fever.   HENT: Positive for sinus pressure. Negative for congestion and ear pain.    Respiratory: Negative for cough, chest tightness, shortness of breath and wheezing.    Cardiovascular: Negative for chest pain and palpitations.   Gastrointestinal: Negative for abdominal pain, blood in stool and constipation.   Skin: Negative for color change.   Allergic/Immunologic: Positive for environmental allergies.   Neurological: Negative for dizziness, speech difficulty and headache.   Psychiatric/Behavioral: Negative for decreased concentration. The patient is not nervous/anxious.         Vital Signs  Vitals:    09/10/20 0807   BP: 128/80   BP Location: Left arm   Patient Position: Sitting   Cuff Size: Adult   Pulse: 76   Temp: 97.3 °F (36.3 °C)   Weight: 69.9 kg (154 lb 3.2 oz)   Height: 151.5 cm (59.65\")   PainSc: 0-No pain       Physical Exam   Constitutional: She is oriented to person, place, and time. She appears well-developed and well-nourished.   HENT:   Head: Normocephalic and atraumatic.   Right Ear: External ear normal.   Left Ear: External ear normal.   Nose: Nose normal.   Mouth/Throat: Oropharynx is clear and moist. No oropharyngeal exudate.   Eyes: Pupils are equal, round, and reactive to light. Conjunctivae and EOM are normal.   Neck: Normal range of motion. Neck supple. No JVD present. No thyromegaly present.   Cardiovascular: Normal rate, regular rhythm, normal heart sounds and intact distal pulses. Exam reveals no gallop and no friction rub.   No murmur heard.  Pulmonary/Chest: Effort normal and breath sounds normal. No respiratory distress. She has no wheezes. She has no rales.   Abdominal: Soft. Bowel sounds are normal. She exhibits no distension and no mass. There is no tenderness. There is no rebound and no guarding. No hernia.   Musculoskeletal: Normal range of motion. She exhibits no edema or tenderness.    Rachel had a diabetic foot exam performed today.   During the foot " exam she had a monofilament test performed (Sensation in both feet is intact to monofilament.).  Vascular Status -  Her right foot exhibits normal foot vasculature  and no edema. Her left foot exhibits normal foot vasculature  and no edema.  Skin Integrity  -  Her right foot skin is intact.Her left foot skin is intact..  Lymphadenopathy:     She has no cervical adenopathy.   Neurological: She is alert and oriented to person, place, and time. She displays normal reflexes. No cranial nerve deficit or sensory deficit. She exhibits normal muscle tone. Coordination normal.   Skin: Skin is warm and dry. No rash noted. No erythema.   Psychiatric: She has a normal mood and affect. Her behavior is normal. Judgment and thought content normal.   Nursing note and vitals reviewed.     Procedures     Fall Risk Screen:  STEADI Fall Risk Assessment has not been completed.    Health Habits and Functional and Cognitive Screening:  No flowsheet data found.    Depression [place]  PHQ-9 Total Score: 0     ACE III MINI             Assessment/Plan:    Rachel was seen today for annual exam.    Diagnoses and all orders for this visit:    Preventative health care    Type 2 diabetes mellitus with diabetic polyneuropathy, without long-term current use of insulin (CMS/[facility])  -     Comprehensive Metabolic Panel; Future  -     Hemoglobin A1c; Future  -     Microalbumin / Creatinine Urine Ratio - Urine, Clean Catch; Future    Mixed hyperlipidemia  -     Lipid Panel; Future    Vitamin D deficiency  -     Vitamin D 25 Hydroxy; Future    Screening for deficiency anemia  -     CBC & Differential; Future    Plan    Overall she remains in very good health with improved lifestyle habits.  Very pleased with her weight loss.  Colonoscopy was performed in 2017, due at 5 years.  Mammography was performed a few days ago.  She is already had her influenza vaccine.    A1c is pending.  Her weight loss is certainly helped.  I encouraged her to try to avoid weight  gain over the winter.  She will continue glimepiride and metformin.    Lipid panel is pending, continue simvastatin and healthy diet.    Vitamin D level is pending, continue 2000 units daily.        Labs  Results for orders placed or performed in visit on 03/06/20   Hemoglobin A1c   Result Value Ref Range    Hemoglobin A1C 8.00 (H) 4.80 - 5.60 %   Comprehensive Metabolic Panel   Result Value Ref Range    Glucose 164 (H) 65 - 99 mg/dL    BUN 12 8 - 23 mg/dL    Creatinine 0.75 0.57 - 1.00 mg/dL    Sodium 139 136 - 145 mmol/L    Potassium 4.6 3.5 - 5.2 mmol/L    Chloride 97 (L) 98 - 107 mmol/L    CO2 26.4 22.0 - 29.0 mmol/L    Calcium 10.2 8.6 - 10.5 mg/dL    Total Protein 7.9 6.0 - 8.5 g/dL    Albumin 4.70 3.50 - 5.20 g/dL    ALT (SGPT) 19 1 - 33 U/L    AST (SGOT) 19 1 - 32 U/L    Alkaline Phosphatase 83 39 - 117 U/L    Total Bilirubin 0.3 0.2 - 1.2 mg/dL    eGFR Non African Amer 78 >60 mL/min/1.73    Globulin 3.2 gm/dL    A/G Ratio 1.5 g/dL    BUN/Creatinine Ratio 16.0 7.0 - 25.0    Anion Gap 15.6 (H) 5.0 - 15.0 mmol/L   Lipid Panel   Result Value Ref Range    Total Cholesterol 190 0 - 200 mg/dL    Triglycerides 332 (H) 0 - 150 mg/dL    HDL Cholesterol 47 40 - 60 mg/dL    LDL Cholesterol  77 0 - 100 mg/dL    VLDL Cholesterol 66.4 (H) 5 - 40 mg/dL    LDL/HDL Ratio 1.63    Vitamin D 25 Hydroxy   Result Value Ref Range    25 Hydroxy, Vitamin D 46.4 30.0 - 100.0 ng/ml        Counseling was given to patient for the following topics: appropriate exercise 150 minutes/week, disease prevention and healthy eating habits.    Plan of care reviewed with patient at the conclusion of today's visit. Education was provided regarding diagnosis, management, and any prescribed or recommended OTC medications.Patient verbalizes understanding of and agreement with management plan.         Jer Richardson MD

## 2021-02-11 RX ORDER — LISINOPRIL 20 MG/1
TABLET ORAL
Qty: 90 TABLET | Refills: 3 | Status: SHIPPED | OUTPATIENT
Start: 2021-02-11 | End: 2021-03-11

## 2021-02-19 RX ORDER — SIMVASTATIN 20 MG
TABLET ORAL
Qty: 90 TABLET | Refills: 3 | Status: SHIPPED | OUTPATIENT
Start: 2021-02-19 | End: 2022-02-14

## 2021-03-11 ENCOUNTER — LAB (OUTPATIENT)
Dept: LAB | Facility: HOSPITAL | Age: 66
End: 2021-03-11

## 2021-03-11 ENCOUNTER — OFFICE VISIT (OUTPATIENT)
Dept: INTERNAL MEDICINE | Facility: CLINIC | Age: 66
End: 2021-03-11

## 2021-03-11 ENCOUNTER — TELEPHONE (OUTPATIENT)
Dept: INTERNAL MEDICINE | Facility: CLINIC | Age: 66
End: 2021-03-11

## 2021-03-11 VITALS
WEIGHT: 152.4 LBS | HEART RATE: 76 BPM | BODY MASS INDEX: 29.92 KG/M2 | DIASTOLIC BLOOD PRESSURE: 80 MMHG | TEMPERATURE: 97.3 F | HEIGHT: 60 IN | SYSTOLIC BLOOD PRESSURE: 136 MMHG

## 2021-03-11 DIAGNOSIS — E78.2 MIXED HYPERLIPIDEMIA: ICD-10-CM

## 2021-03-11 DIAGNOSIS — I10 BENIGN ESSENTIAL HYPERTENSION: ICD-10-CM

## 2021-03-11 DIAGNOSIS — E55.9 VITAMIN D DEFICIENCY: ICD-10-CM

## 2021-03-11 DIAGNOSIS — E11.42 TYPE 2 DIABETES MELLITUS WITH DIABETIC POLYNEUROPATHY, WITHOUT LONG-TERM CURRENT USE OF INSULIN (HCC): ICD-10-CM

## 2021-03-11 DIAGNOSIS — L57.0 ACTINIC KERATOSES: ICD-10-CM

## 2021-03-11 DIAGNOSIS — E11.42 TYPE 2 DIABETES MELLITUS WITH DIABETIC POLYNEUROPATHY, WITHOUT LONG-TERM CURRENT USE OF INSULIN (HCC): Primary | ICD-10-CM

## 2021-03-11 LAB
25(OH)D3 SERPL-MCNC: 54.9 NG/ML
ALBUMIN SERPL-MCNC: 4.8 G/DL (ref 3.5–5.2)
ALBUMIN/GLOB SERPL: 1.7 G/DL
ALP SERPL-CCNC: 72 U/L (ref 39–117)
ALT SERPL W P-5'-P-CCNC: 19 U/L (ref 1–33)
ANION GAP SERPL CALCULATED.3IONS-SCNC: 13.2 MMOL/L (ref 5–15)
AST SERPL-CCNC: 18 U/L (ref 1–32)
BILIRUB SERPL-MCNC: 0.3 MG/DL (ref 0–1.2)
BUN SERPL-MCNC: 14 MG/DL (ref 8–23)
BUN/CREAT SERPL: 19.7 (ref 7–25)
CALCIUM SPEC-SCNC: 9.9 MG/DL (ref 8.6–10.5)
CHLORIDE SERPL-SCNC: 98 MMOL/L (ref 98–107)
CHOLEST SERPL-MCNC: 170 MG/DL (ref 0–200)
CO2 SERPL-SCNC: 28.8 MMOL/L (ref 22–29)
CREAT SERPL-MCNC: 0.71 MG/DL (ref 0.57–1)
GFR SERPL CREATININE-BSD FRML MDRD: 83 ML/MIN/1.73
GLOBULIN UR ELPH-MCNC: 2.8 GM/DL
GLUCOSE SERPL-MCNC: 131 MG/DL (ref 65–99)
HBA1C MFR BLD: 6.85 % (ref 4.8–5.6)
HDLC SERPL-MCNC: 46 MG/DL (ref 40–60)
LDLC SERPL CALC-MCNC: 74 MG/DL (ref 0–100)
LDLC/HDLC SERPL: 1.33 {RATIO}
POTASSIUM SERPL-SCNC: 5 MMOL/L (ref 3.5–5.2)
PROT SERPL-MCNC: 7.6 G/DL (ref 6–8.5)
SODIUM SERPL-SCNC: 140 MMOL/L (ref 136–145)
TRIGL SERPL-MCNC: 314 MG/DL (ref 0–150)
VLDLC SERPL-MCNC: 50 MG/DL (ref 5–40)

## 2021-03-11 PROCEDURE — 80061 LIPID PANEL: CPT

## 2021-03-11 PROCEDURE — 99214 OFFICE O/P EST MOD 30 MIN: CPT | Performed by: INTERNAL MEDICINE

## 2021-03-11 PROCEDURE — 83036 HEMOGLOBIN GLYCOSYLATED A1C: CPT

## 2021-03-11 PROCEDURE — 17110 DESTRUCTION B9 LES UP TO 14: CPT | Performed by: INTERNAL MEDICINE

## 2021-03-11 PROCEDURE — 82306 VITAMIN D 25 HYDROXY: CPT

## 2021-03-11 PROCEDURE — 80053 COMPREHEN METABOLIC PANEL: CPT

## 2021-03-11 RX ORDER — LISINOPRIL 20 MG/1
20 TABLET ORAL DAILY
Qty: 90 TABLET | Refills: 3
Start: 2021-03-11 | End: 2022-02-07

## 2021-03-11 RX ORDER — MULTIVIT WITH MINERALS/LUTEIN
500 TABLET ORAL DAILY
COMMUNITY
End: 2022-09-14

## 2021-03-11 NOTE — PROGRESS NOTES
[facility]     Rachel Dale  1955   [id number]      Patient Care Team:  Jer Richardson MD as PCP - General (Internal Medicine)    Chief Complaint::   Chief Complaint   Patient presents with   • Hyperlipidemia   • Diabetes   • Rash     face        HPI  Ms. Dale comes in for follow-up of her diabetes, hyperlipidemia, hypertension and vitamin D deficiency overall she feels very well.  She has lost 2 more pounds.  Last visit she had dramatically reduced her A1c with weight loss.  She has 2 skin lesions on her face she is concerned about.  One seems to be a little bit darker but they are both brought to touch.  There is no fever, cough, shortness of breath or chest pain.  She has received the first dose of her Covid vaccine.    Chronic Conditions:      Patient Active Problem List   Diagnosis   • Allergic rhinitis   • Diabetes mellitus, type 2 (CMS/HCC)   • Peripheral neuropathy   • Idiopathic peripheral neuropathy   • Mixed hyperlipidemia   • Obesity   • Osteoporosis   • Vitamin D deficiency        No past medical history on file.    Past Surgical History:   Procedure Laterality Date   • BREAST SURGERY      BREAST REDUCTION    • CHOLECYSTECTOMY  2009       Family History   Problem Relation Age of Onset   • Hypertension Mother    • Stroke Father    • Colon cancer Brother 74   • Breast cancer Maternal Aunt    • Coronary artery disease Maternal Grandfather        Social History     Socioeconomic History   • Marital status:      Spouse name: Not on file   • Number of children: Not on file   • Years of education: Not on file   • Highest education level: Not on file   Tobacco Use   • Smoking status: Never Smoker   • Smokeless tobacco: Never Used   Substance and Sexual Activity   • Alcohol use: Yes     Alcohol/week: 1.0 standard drinks     Types: 1 Glasses of wine per week     Comment: yearly   • Drug use: Defer   • Sexual activity: Defer       Allergies   Allergen Reactions   • Sulfa  Antibiotics Other (See Comments)     OTHER         Current Outpatient Medications:   •  Acetaminophen (TYLENOL ARTHRITIS PAIN PO), Take 2 tablets by mouth Daily., Disp: , Rfl:   •  aspirin 81 MG EC tablet, Take 81 mg by mouth Daily., Disp: , Rfl:   •  calcium carbonate (OS-ALTAGRACIA) 600 MG tablet, Take 600 mg by mouth Daily., Disp: , Rfl:   •  cetirizine (zyrTEC) 10 MG tablet, Take 10 mg by mouth Daily., Disp: , Rfl:   •  glimepiride (AMARYL) 4 MG tablet, TAKE 1 TABLET TWICE A DAY, Disp: 180 tablet, Rfl: 3  •  lisinopril (PRINIVIL,ZESTRIL) 20 MG tablet, TAKE 1 TABLET DAILY (Patient taking differently: Take 10 mg by mouth.), Disp: 90 tablet, Rfl: 3  •  Lutein-Zeaxanthin 25-5 MG capsule, Take 1 capsule by mouth Daily., Disp: , Rfl:   •  metFORMIN (GLUCOPHAGE) 1000 MG tablet, TAKE 1 TABLET TWICE A DAY WITH MEALS, Disp: 180 tablet, Rfl: 3  •  Omega-3 Fatty Acids (FISH OIL ULTRA) 1400 MG capsule, Take 1 capsule by mouth Daily., Disp: , Rfl:   •  omeprazole (priLOSEC) 20 MG capsule, Take 20 mg by mouth Daily., Disp: , Rfl:   •  simvastatin (ZOCOR) 20 MG tablet, TAKE 1 TABLET EVERY EVENING, Disp: 90 tablet, Rfl: 3  •  vitamin C (ASCORBIC ACID) 250 MG tablet, Take 500 mg by mouth Daily., Disp: , Rfl:   •  Vitamin D, Ergocalciferol, 2000 units capsule, Take 1 capsule by mouth Daily., Disp: , Rfl:   •  vitamin E 400 UNIT capsule, Take 400 Units by mouth Daily., Disp: , Rfl:     Review of Systems   Constitutional: Negative for chills, fatigue and fever.   HENT: Negative for congestion, ear pain and sinus pressure.    Respiratory: Negative for cough, chest tightness, shortness of breath and wheezing.    Cardiovascular: Negative for chest pain and palpitations.   Gastrointestinal: Negative for abdominal pain, blood in stool and constipation.   Skin: Negative for color change.   Allergic/Immunologic: Positive for environmental allergies.   Neurological: Negative for dizziness, speech difficulty and headache.   Psychiatric/Behavioral:  "Negative for decreased concentration. The patient is not nervous/anxious.         Vital Signs  Vitals:    03/11/21 0838   BP: 136/80   BP Location: Left arm   Patient Position: Sitting   Cuff Size: Adult   Pulse: 76   Temp: 97.3 °F (36.3 °C)   Weight: 69.1 kg (152 lb 6.4 oz)   Height: 151.5 cm (59.65\")   PainSc: 0-No pain       Physical Exam  Vitals reviewed.   Constitutional:       Appearance: She is well-developed.   HENT:      Head: Normocephalic and atraumatic.   Cardiovascular:      Rate and Rhythm: Normal rate and regular rhythm.      Heart sounds: Normal heart sounds. No murmur.   Pulmonary:      Effort: Pulmonary effort is normal.      Breath sounds: Normal breath sounds.   Skin:     Comments: She has diffuse freckling and lentigines on face and upper extremities.  On the right forehead and below the left there are slightly raised crusted lesions.   Neurological:      Mental Status: She is alert and oriented to person, place, and time.          Cryotherapy, Skin Lesion    Date/Time: 3/11/2021 9:04 AM  Performed by: Jer Richardson MD  Authorized by: Jer Richardson MD   Local anesthesia used: no    Anesthesia:  Local anesthesia used: no    Sedation:  Patient sedated: no    Patient tolerance: patient tolerated the procedure well with no immediate complications  Comments: Actinic keratoses located right forehead and below the left eye are treated with liquid nitrogen without problem.          ACE III MINI             Assessment/Plan:    Diagnoses and all orders for this visit:    1. Type 2 diabetes mellitus with diabetic polyneuropathy, without long-term current use of insulin (CMS/[facility]) (Primary)  -     Comprehensive Metabolic Panel; Future  -     Hemoglobin A1c; Future    2. Mixed hyperlipidemia  -     Lipid Panel; Future    3. Vitamin D deficiency  -     Vitamin D 25 Hydroxy; Future    4. Benign essential hypertension    5. Actinic keratoses    Plan    A1c is pending, I am pleased with ongoing " weight loss.  Continue glimepiride, Metformin and healthy diet.    Lipid panel is pending, continue simvastatin and healthy diet.    Vitamin D level is pending, she will continue 2000 units of vitamin D daily.  Blood pressure is well controlled on lisinopril.        Plan of care reviewed with patient at the conclusion of today's visit. Education was provided regarding diagnosis, management, and any prescribed or recommended OTC medications.Patient verbalizes understanding of and agreement with management plan.         Jer Richardson MD

## 2021-08-31 RX ORDER — GLIMEPIRIDE 4 MG/1
TABLET ORAL
Qty: 180 TABLET | Refills: 3 | Status: SHIPPED | OUTPATIENT
Start: 2021-08-31 | End: 2022-06-02 | Stop reason: SDUPTHER

## 2021-09-14 ENCOUNTER — LAB (OUTPATIENT)
Dept: LAB | Facility: HOSPITAL | Age: 66
End: 2021-09-14

## 2021-09-14 ENCOUNTER — OFFICE VISIT (OUTPATIENT)
Dept: INTERNAL MEDICINE | Facility: CLINIC | Age: 66
End: 2021-09-14

## 2021-09-14 VITALS
HEIGHT: 60 IN | HEART RATE: 78 BPM | SYSTOLIC BLOOD PRESSURE: 138 MMHG | OXYGEN SATURATION: 94 % | DIASTOLIC BLOOD PRESSURE: 88 MMHG | BODY MASS INDEX: 31.02 KG/M2 | WEIGHT: 158 LBS | TEMPERATURE: 98.6 F

## 2021-09-14 DIAGNOSIS — E78.2 MIXED HYPERLIPIDEMIA: ICD-10-CM

## 2021-09-14 DIAGNOSIS — E11.42 TYPE 2 DIABETES MELLITUS WITH DIABETIC POLYNEUROPATHY, WITHOUT LONG-TERM CURRENT USE OF INSULIN (HCC): ICD-10-CM

## 2021-09-14 DIAGNOSIS — Z11.59 ENCOUNTER FOR HEPATITIS C SCREENING TEST FOR LOW RISK PATIENT: ICD-10-CM

## 2021-09-14 DIAGNOSIS — E55.9 VITAMIN D DEFICIENCY: ICD-10-CM

## 2021-09-14 DIAGNOSIS — I10 ESSENTIAL HYPERTENSION: ICD-10-CM

## 2021-09-14 DIAGNOSIS — M81.0 AGE-RELATED OSTEOPOROSIS WITHOUT CURRENT PATHOLOGICAL FRACTURE: ICD-10-CM

## 2021-09-14 DIAGNOSIS — Z00.00 PREVENTATIVE HEALTH CARE: Primary | ICD-10-CM

## 2021-09-14 LAB
25(OH)D3 SERPL-MCNC: 39.5 NG/ML (ref 30–100)
ALBUMIN SERPL-MCNC: 4.6 G/DL (ref 3.5–5.2)
ALBUMIN UR-MCNC: <1.2 MG/DL
ALBUMIN/GLOB SERPL: 1.5 G/DL
ALP SERPL-CCNC: 79 U/L (ref 39–117)
ALT SERPL W P-5'-P-CCNC: 23 U/L (ref 1–33)
ANION GAP SERPL CALCULATED.3IONS-SCNC: 12.9 MMOL/L (ref 5–15)
AST SERPL-CCNC: 22 U/L (ref 1–32)
BASOPHILS # BLD AUTO: 0.05 10*3/MM3 (ref 0–0.2)
BASOPHILS NFR BLD AUTO: 0.7 % (ref 0–1.5)
BILIRUB SERPL-MCNC: 0.3 MG/DL (ref 0–1.2)
BUN SERPL-MCNC: 13 MG/DL (ref 8–23)
BUN/CREAT SERPL: 16.7 (ref 7–25)
CALCIUM SPEC-SCNC: 9.3 MG/DL (ref 8.6–10.5)
CHLORIDE SERPL-SCNC: 98 MMOL/L (ref 98–107)
CHOLEST SERPL-MCNC: 209 MG/DL (ref 0–200)
CO2 SERPL-SCNC: 27.1 MMOL/L (ref 22–29)
CREAT SERPL-MCNC: 0.78 MG/DL (ref 0.57–1)
CREAT UR-MCNC: 95.5 MG/DL
DEPRECATED RDW RBC AUTO: 43 FL (ref 37–54)
EOSINOPHIL # BLD AUTO: 0.12 10*3/MM3 (ref 0–0.4)
EOSINOPHIL NFR BLD AUTO: 1.7 % (ref 0.3–6.2)
ERYTHROCYTE [DISTWIDTH] IN BLOOD BY AUTOMATED COUNT: 13.8 % (ref 12.3–15.4)
GFR SERPL CREATININE-BSD FRML MDRD: 74 ML/MIN/1.73
GLOBULIN UR ELPH-MCNC: 3 GM/DL
GLUCOSE SERPL-MCNC: 164 MG/DL (ref 65–99)
HBA1C MFR BLD: 7.67 % (ref 4.8–5.6)
HCT VFR BLD AUTO: 39.6 % (ref 34–46.6)
HCV AB SER DONR QL: NORMAL
HDLC SERPL-MCNC: 51 MG/DL (ref 40–60)
HGB BLD-MCNC: 12.9 G/DL (ref 12–15.9)
IMM GRANULOCYTES # BLD AUTO: 0.02 10*3/MM3 (ref 0–0.05)
IMM GRANULOCYTES NFR BLD AUTO: 0.3 % (ref 0–0.5)
LDLC SERPL CALC-MCNC: 107 MG/DL (ref 0–100)
LDLC/HDLC SERPL: 1.92 {RATIO}
LYMPHOCYTES # BLD AUTO: 2.8 10*3/MM3 (ref 0.7–3.1)
LYMPHOCYTES NFR BLD AUTO: 40.1 % (ref 19.6–45.3)
MCH RBC QN AUTO: 28.3 PG (ref 26.6–33)
MCHC RBC AUTO-ENTMCNC: 32.6 G/DL (ref 31.5–35.7)
MCV RBC AUTO: 86.8 FL (ref 79–97)
MICROALBUMIN/CREAT UR: NORMAL MG/G{CREAT}
MONOCYTES # BLD AUTO: 0.55 10*3/MM3 (ref 0.1–0.9)
MONOCYTES NFR BLD AUTO: 7.9 % (ref 5–12)
NEUTROPHILS NFR BLD AUTO: 3.45 10*3/MM3 (ref 1.7–7)
NEUTROPHILS NFR BLD AUTO: 49.3 % (ref 42.7–76)
NRBC BLD AUTO-RTO: 0 /100 WBC (ref 0–0.2)
PLATELET # BLD AUTO: 351 10*3/MM3 (ref 140–450)
PMV BLD AUTO: 9.5 FL (ref 6–12)
POTASSIUM SERPL-SCNC: 4.1 MMOL/L (ref 3.5–5.2)
PROT SERPL-MCNC: 7.6 G/DL (ref 6–8.5)
RBC # BLD AUTO: 4.56 10*6/MM3 (ref 3.77–5.28)
SODIUM SERPL-SCNC: 138 MMOL/L (ref 136–145)
TRIGL SERPL-MCNC: 301 MG/DL (ref 0–150)
VLDLC SERPL-MCNC: 51 MG/DL (ref 5–40)
WBC # BLD AUTO: 6.99 10*3/MM3 (ref 3.4–10.8)

## 2021-09-14 PROCEDURE — 85025 COMPLETE CBC W/AUTO DIFF WBC: CPT

## 2021-09-14 PROCEDURE — 99396 PREV VISIT EST AGE 40-64: CPT | Performed by: INTERNAL MEDICINE

## 2021-09-14 PROCEDURE — 82306 VITAMIN D 25 HYDROXY: CPT

## 2021-09-14 PROCEDURE — 80061 LIPID PANEL: CPT

## 2021-09-14 PROCEDURE — 83036 HEMOGLOBIN GLYCOSYLATED A1C: CPT

## 2021-09-14 PROCEDURE — 86803 HEPATITIS C AB TEST: CPT

## 2021-09-14 PROCEDURE — 80053 COMPREHEN METABOLIC PANEL: CPT

## 2021-09-14 PROCEDURE — 82570 ASSAY OF URINE CREATININE: CPT

## 2021-09-14 PROCEDURE — 82043 UR ALBUMIN QUANTITATIVE: CPT

## 2021-09-14 NOTE — PROGRESS NOTES
[facility]     Rachel Dale  1955   [id number]      Patient Care Team:  Jer Richardson MD as PCP - General (Internal Medicine)    Chief Complaint::   Chief Complaint   Patient presents with   • Annual Exam     fasting        HPI  Ms. Dale is now 66.  She comes in for follow-up of her diabetes, hyperlipidemia, vitamin D deficiency, hypertension, osteoporosis and for annual examination.  She feels well but admits she has been busy this summer taking care of her brother who has had a heart problems and has neglected herself to some degree.  Her weight is up a few pounds.  She has no complaints.  There is no fever, cough, shortness of breath or chest pain.    Chronic Conditions:      Patient Active Problem List   Diagnosis   • Allergic rhinitis   • Diabetes mellitus, type 2 (CMS/HCC)   • Peripheral neuropathy   • Idiopathic peripheral neuropathy   • Mixed hyperlipidemia   • Obesity   • Osteoporosis   • Vitamin D deficiency        No past medical history on file.    Past Surgical History:   Procedure Laterality Date   • BREAST SURGERY      BREAST REDUCTION    • CHOLECYSTECTOMY  2009       Family History   Problem Relation Age of Onset   • Hypertension Mother    • Stroke Father    • Colon cancer Brother 74   • Breast cancer Maternal Aunt    • Coronary artery disease Maternal Grandfather        Social History     Socioeconomic History   • Marital status:      Spouse name: Not on file   • Number of children: Not on file   • Years of education: Not on file   • Highest education level: Not on file   Tobacco Use   • Smoking status: Never Smoker   • Smokeless tobacco: Never Used   Vaping Use   • Vaping Use: Never used   Substance and Sexual Activity   • Alcohol use: Yes     Alcohol/week: 1.0 standard drinks     Types: 1 Glasses of wine per week     Comment: yearly   • Drug use: Defer   • Sexual activity: Defer       Allergies   Allergen Reactions   • Sulfa Antibiotics Other (See Comments)      OTHER         Current Outpatient Medications:   •  Acetaminophen (TYLENOL ARTHRITIS PAIN PO), Take 2 tablets by mouth Daily., Disp: , Rfl:   •  aspirin 81 MG EC tablet, Take 81 mg by mouth Daily., Disp: , Rfl:   •  calcium carbonate (OS-ALTAGRACIA) 600 MG tablet, Take 600 mg by mouth Daily., Disp: , Rfl:   •  cetirizine (zyrTEC) 10 MG tablet, Take 10 mg by mouth Daily., Disp: , Rfl:   •  glimepiride (AMARYL) 4 MG tablet, TAKE 1 TABLET TWICE A DAY, Disp: 180 tablet, Rfl: 3  •  lisinopril (PRINIVIL,ZESTRIL) 20 MG tablet, Take 1 tablet by mouth Daily., Disp: 90 tablet, Rfl: 3  •  Lutein-Zeaxanthin 25-5 MG capsule, Take 1 capsule by mouth Daily., Disp: , Rfl:   •  metFORMIN (GLUCOPHAGE) 1000 MG tablet, TAKE 1 TABLET TWICE A DAY WITH MEALS, Disp: 180 tablet, Rfl: 3  •  Omega-3 Fatty Acids (FISH OIL ULTRA) 1400 MG capsule, Take 1 capsule by mouth Daily., Disp: , Rfl:   •  omeprazole (priLOSEC) 20 MG capsule, Take 20 mg by mouth Daily., Disp: , Rfl:   •  simvastatin (ZOCOR) 20 MG tablet, TAKE 1 TABLET EVERY EVENING, Disp: 90 tablet, Rfl: 3  •  vitamin C (ASCORBIC ACID) 250 MG tablet, Take 500 mg by mouth Daily., Disp: , Rfl:   •  Vitamin D, Ergocalciferol, 2000 units capsule, Take 1 capsule by mouth Daily., Disp: , Rfl:   •  vitamin E 400 UNIT capsule, Take 400 Units by mouth Daily., Disp: , Rfl:     Immunization History   Administered Date(s) Administered   • COVID-19 (PFIZER) 03/12/2021, 04/07/2021   • Flu Vaccine Quad PF >18YRS 09/06/2019   • FluLaval >6 Months 08/31/2020   • Influenza Quad Vaccine (Inpatient) 10/14/2015   • Pneumococcal Conjugate 13-Valent (PCV13) 10/10/2018   • Pneumococcal Polysaccharide (PPSV23) 09/23/2019   • Shingrix 08/31/2020, 12/31/2020   • Tdap 03/25/2009        Health Maintenance Due   Topic Date Due   • DXA SCAN  Never done   • HEPATITIS C SCREENING  Never done   • PAP SMEAR  Never done   • TDAP/TD VACCINES (2 - Td or Tdap) 03/25/2019   • DIABETIC FOOT EXAM  09/10/2021   • URINE MICROALBUMIN   "09/10/2021   • ANNUAL PHYSICAL  09/11/2021   • HEMOGLOBIN A1C  09/11/2021        Review of Systems   Constitutional: Negative for activity change, chills, fatigue, fever, unexpected weight gain and unexpected weight loss.   HENT: Negative for congestion, ear pain, hearing loss, postnasal drip, sinus pressure, trouble swallowing and voice change.    Eyes: Negative for blurred vision, double vision and visual disturbance.   Respiratory: Negative for cough and shortness of breath.    Cardiovascular: Negative for chest pain, palpitations and leg swelling.   Gastrointestinal: Negative for abdominal pain, blood in stool, constipation, diarrhea, nausea, vomiting and indigestion.   Endocrine: Negative for cold intolerance, heat intolerance, polydipsia and polyuria.   Genitourinary: Negative for breast discharge, breast lump, decreased libido, dysuria, menstrual problem, urgency, urinary incontinence, vaginal bleeding and vaginal discharge.   Musculoskeletal: Negative for back pain, joint swelling and myalgias.   Skin: Negative for skin lesions.   Allergic/Immunologic: Negative for environmental allergies.   Neurological: Negative for dizziness, syncope, speech difficulty, weakness, light-headedness, numbness, headache, memory problem and confusion.   Hematological: Negative for adenopathy. Does not bruise/bleed easily.   Psychiatric/Behavioral: Negative for agitation, behavioral problems, decreased concentration, sleep disturbance, depressed mood and stress. The patient is not nervous/anxious.         Vital Signs  Vitals:    09/14/21 0821 09/14/21 0914   BP: 144/94 138/88   BP Location: Left arm    Patient Position: Sitting    Cuff Size: Adult    Pulse: 78    Temp: 98.6 °F (37 °C)    TempSrc: Temporal    SpO2: 94%    Weight: 71.7 kg (158 lb)    Height: 151.1 cm (59.5\")    PainSc: 0-No pain        Physical Exam  Vitals and nursing note reviewed.   Constitutional:       Appearance: She is well-developed.   HENT:      Head: " Normocephalic and atraumatic.      Right Ear: External ear normal.      Left Ear: External ear normal.      Nose: Nose normal.      Mouth/Throat:      Pharynx: No oropharyngeal exudate.   Eyes:      Conjunctiva/sclera: Conjunctivae normal.      Pupils: Pupils are equal, round, and reactive to light.   Neck:      Thyroid: No thyromegaly.      Vascular: No JVD.   Cardiovascular:      Rate and Rhythm: Normal rate and regular rhythm.      Pulses:           Dorsalis pedis pulses are 1+ on the right side and 1+ on the left side.      Heart sounds: Normal heart sounds. No murmur heard.   No friction rub. No gallop.    Pulmonary:      Effort: Pulmonary effort is normal. No respiratory distress.      Breath sounds: Normal breath sounds. No wheezing or rales.   Chest:      Chest wall: No tenderness.   Abdominal:      General: Bowel sounds are normal. There is no distension.      Palpations: Abdomen is soft. There is no mass.      Tenderness: There is no abdominal tenderness. There is no guarding or rebound.      Hernia: No hernia is present.   Musculoskeletal:         General: No tenderness. Normal range of motion.      Cervical back: Normal range of motion and neck supple.      Right foot: No deformity.      Left foot: No deformity.   Feet:      Right foot:      Protective Sensation: 5 sites tested. 5 sites sensed.      Skin integrity: Skin integrity normal.      Left foot:      Protective Sensation: 5 sites tested. 5 sites sensed.      Skin integrity: Skin integrity normal.      Comments: Sensation in both feet intact to monofilament.     Diabetic Foot Exam Performed and Monofilament Test Performed    Lymphadenopathy:      Cervical: No cervical adenopathy.   Skin:     General: Skin is warm and dry.      Findings: No erythema or rash.   Neurological:      Mental Status: She is alert and oriented to person, place, and time.      Cranial Nerves: No cranial nerve deficit.      Sensory: No sensory deficit.      Motor: No  abnormal muscle tone.      Coordination: Coordination normal.      Deep Tendon Reflexes: Reflexes normal.   Psychiatric:         Behavior: Behavior normal.         Thought Content: Thought content normal.         Judgment: Judgment normal.          Procedures     Fall Risk Screen:  NANNETTE Fall Risk Assessment has not been completed.    Health Habits and Functional and Cognitive Screening:  No flowsheet data found.    Depression [place]  PHQ-9 Total Score:       ACE III MINI             Assessment/Plan:    Diagnoses and all orders for this visit:    1. Preventative health care (Primary)    2. Type 2 diabetes mellitus with diabetic polyneuropathy, without long-term current use of insulin (CMS/[facility])  -     Comprehensive Metabolic Panel; Future  -     Hemoglobin A1c; Future  -     Microalbumin / Creatinine Urine Ratio - Urine, Clean Catch; Future    3. Mixed hyperlipidemia  -     Lipid Panel; Future    4. Vitamin D deficiency  -     Vitamin D 25 Hydroxy; Future    5. Essential hypertension  -     CBC & Differential; Future    6. Encounter for hepatitis C screening test for low risk patient  -     Hepatitis C Antibody; Future    7. Age-related osteoporosis without current pathological fracture    Plan    Overall she is doing very well.  Encouraged her to improve diet and fitness to lose the weight she gained.  She is up-to-date on mammography and colorectal cancer screening.  Is been 2 to 3 years since her last Pap smear.  She has always had normal Pap smears and is not sexually active so she declines to have further exams.  She is fully vaccinated against Covid 19.  I encouraged her to keep her eyes open per recommendations of the booster.    A1c is pending, she will continue healthy diet along with glimepiride and Metformin.  Foot exam was performed today.  She is up-to-date on eye examination.    Lipid panel is pending, continue simvastatin and healthy diet.    Vitamin D level is pending, she will continue 2000 units  of vitamin D daily.    Blood pressure is well controlled on lisinopril.    She is due for DEXA, will discuss this on next visit.  For now she will continue calcium and vitamin D.    Patient's Body mass index is 31.38 kg/m². indicating that she is obese (BMI >30). Obesity-related health conditions include the following: hypertension, diabetes mellitus and dyslipidemias. Obesity is worsening. BMI is is above average; BMI management plan is completed. We discussed portion control, increasing exercise and joining a fitness center or start home based exercise program..          Labs  Results for orders placed or performed in visit on 03/11/21   Comprehensive Metabolic Panel    Specimen: Blood   Result Value Ref Range    Glucose 131 (H) 65 - 99 mg/dL    BUN 14 8 - 23 mg/dL    Creatinine 0.71 0.57 - 1.00 mg/dL    Sodium 140 136 - 145 mmol/L    Potassium 5.0 3.5 - 5.2 mmol/L    Chloride 98 98 - 107 mmol/L    CO2 28.8 22.0 - 29.0 mmol/L    Calcium 9.9 8.6 - 10.5 mg/dL    Total Protein 7.6 6.0 - 8.5 g/dL    Albumin 4.80 3.50 - 5.20 g/dL    ALT (SGPT) 19 1 - 33 U/L    AST (SGOT) 18 1 - 32 U/L    Alkaline Phosphatase 72 39 - 117 U/L    Total Bilirubin 0.3 0.0 - 1.2 mg/dL    eGFR Non African Amer 83 >60 mL/min/1.73    Globulin 2.8 gm/dL    A/G Ratio 1.7 g/dL    BUN/Creatinine Ratio 19.7 7.0 - 25.0    Anion Gap 13.2 5.0 - 15.0 mmol/L   Hemoglobin A1c    Specimen: Blood   Result Value Ref Range    Hemoglobin A1C 6.85 (H) 4.80 - 5.60 %   Lipid Panel    Specimen: Blood   Result Value Ref Range    Total Cholesterol 170 0 - 200 mg/dL    Triglycerides 314 (H) 0 - 150 mg/dL    HDL Cholesterol 46 40 - 60 mg/dL    LDL Cholesterol  74 0 - 100 mg/dL    VLDL Cholesterol 50 (H) 5 - 40 mg/dL    LDL/HDL Ratio 1.33    Vitamin D 25 Hydroxy    Specimen: Blood   Result Value Ref Range    25 Hydroxy, Vitamin D 54.9 ng/ml        Counseling was given to patient for the following topics: appropriate exercise under 150 minutes/week, disease prevention  and healthy eating habits.    Plan of care reviewed with patient at the conclusion of today's visit. Education was provided regarding diagnosis, management, and any prescribed or recommended OTC medications.Patient verbalizes understanding of and agreement with management plan.         Jer Richardson MD

## 2021-11-29 NOTE — TELEPHONE ENCOUNTER
Rx Refill Note  Requested Prescriptions     Pending Prescriptions Disp Refills   • empagliflozin (Jardiance) 10 MG tablet tablet 90 tablet 1     Sig: Take 1 tablet by mouth Daily.      Last office visit with prescribing clinician: 9/14/2021      Next office visit with prescribing clinician: 3/14/2022            Hannah Conway  11/29/21, 13:17 EST

## 2021-11-29 NOTE — TELEPHONE ENCOUNTER
Caller: Rachel Dale    Relationship: Self    Best call back number: [phone]    Requested Prescriptions:   Requested Prescriptions     Pending Prescriptions Disp Refills   • empagliflozin (Jardiance) 10 MG tablet tablet 90 tablet 1     Sig: Take 1 tablet by mouth Daily.        Pharmacy where request should be sent: [facility] - [address] [phone] [facility] [phone] [facility]     Additional details provided by patient: INSURANCE WON'T COVER IT. IS THERE A REPLACEMENT THAT IS AFFORDABLE OR ARE THERE ANY SAMPLES SHE CAN USE?    Does the patient have less than a 3 day supply:  [x] Yes  [] No    Michael Vega, PCT   11/29/21 10:56 EST

## 2022-02-07 RX ORDER — LISINOPRIL 20 MG/1
TABLET ORAL
Qty: 90 TABLET | Refills: 3 | Status: SHIPPED | OUTPATIENT
Start: 2022-02-07 | End: 2022-05-09 | Stop reason: SDUPTHER

## 2022-02-14 RX ORDER — SIMVASTATIN 20 MG
TABLET ORAL
Qty: 90 TABLET | Refills: 3 | Status: SHIPPED | OUTPATIENT
Start: 2022-02-14 | End: 2022-12-07 | Stop reason: SDUPTHER

## 2022-03-14 ENCOUNTER — OFFICE VISIT (OUTPATIENT)
Dept: INTERNAL MEDICINE | Facility: CLINIC | Age: 67
End: 2022-03-14

## 2022-03-14 ENCOUNTER — LAB (OUTPATIENT)
Dept: LAB | Facility: HOSPITAL | Age: 67
End: 2022-03-14

## 2022-03-14 VITALS
DIASTOLIC BLOOD PRESSURE: 80 MMHG | BODY MASS INDEX: 31.41 KG/M2 | TEMPERATURE: 97.8 F | HEIGHT: 59 IN | SYSTOLIC BLOOD PRESSURE: 126 MMHG | HEART RATE: 76 BPM | WEIGHT: 155.8 LBS

## 2022-03-14 DIAGNOSIS — E78.2 MIXED HYPERLIPIDEMIA: ICD-10-CM

## 2022-03-14 DIAGNOSIS — L91.8 INFLAMED SKIN TAG: ICD-10-CM

## 2022-03-14 DIAGNOSIS — E55.9 VITAMIN D DEFICIENCY: ICD-10-CM

## 2022-03-14 DIAGNOSIS — I10 ESSENTIAL HYPERTENSION: ICD-10-CM

## 2022-03-14 DIAGNOSIS — E11.42 TYPE 2 DIABETES MELLITUS WITH DIABETIC POLYNEUROPATHY, WITHOUT LONG-TERM CURRENT USE OF INSULIN: ICD-10-CM

## 2022-03-14 DIAGNOSIS — E11.42 TYPE 2 DIABETES MELLITUS WITH DIABETIC POLYNEUROPATHY, WITHOUT LONG-TERM CURRENT USE OF INSULIN: Primary | ICD-10-CM

## 2022-03-14 LAB
25(OH)D3 SERPL-MCNC: 40.8 NG/ML (ref 30–100)
ALBUMIN SERPL-MCNC: 4.7 G/DL (ref 3.5–5.2)
ALBUMIN/GLOB SERPL: 1.6 G/DL
ALP SERPL-CCNC: 82 U/L (ref 39–117)
ALT SERPL W P-5'-P-CCNC: 16 U/L (ref 1–33)
ANION GAP SERPL CALCULATED.3IONS-SCNC: 14.1 MMOL/L (ref 5–15)
AST SERPL-CCNC: 16 U/L (ref 1–32)
BILIRUB SERPL-MCNC: 0.3 MG/DL (ref 0–1.2)
BUN SERPL-MCNC: 13 MG/DL (ref 8–23)
BUN/CREAT SERPL: 18.6 (ref 7–25)
CALCIUM SPEC-SCNC: 10 MG/DL (ref 8.6–10.5)
CHLORIDE SERPL-SCNC: 98 MMOL/L (ref 98–107)
CHOLEST SERPL-MCNC: 199 MG/DL (ref 0–200)
CO2 SERPL-SCNC: 28.9 MMOL/L (ref 22–29)
CREAT SERPL-MCNC: 0.7 MG/DL (ref 0.57–1)
EGFRCR SERPLBLD CKD-EPI 2021: 96.7 ML/MIN/1.73
GLOBULIN UR ELPH-MCNC: 2.9 GM/DL
GLUCOSE SERPL-MCNC: 197 MG/DL (ref 65–99)
HBA1C MFR BLD: 9.2 % (ref 4.8–5.6)
HDLC SERPL-MCNC: 49 MG/DL (ref 40–60)
LDLC SERPL CALC-MCNC: 92 MG/DL (ref 0–100)
LDLC/HDLC SERPL: 1.64 {RATIO}
POTASSIUM SERPL-SCNC: 4.5 MMOL/L (ref 3.5–5.2)
PROT SERPL-MCNC: 7.6 G/DL (ref 6–8.5)
SODIUM SERPL-SCNC: 141 MMOL/L (ref 136–145)
TRIGL SERPL-MCNC: 348 MG/DL (ref 0–150)
VLDLC SERPL-MCNC: 58 MG/DL (ref 5–40)

## 2022-03-14 PROCEDURE — 80061 LIPID PANEL: CPT

## 2022-03-14 PROCEDURE — 99214 OFFICE O/P EST MOD 30 MIN: CPT | Performed by: INTERNAL MEDICINE

## 2022-03-14 PROCEDURE — 80053 COMPREHEN METABOLIC PANEL: CPT

## 2022-03-14 PROCEDURE — 82306 VITAMIN D 25 HYDROXY: CPT

## 2022-03-14 PROCEDURE — 17110 DESTRUCTION B9 LES UP TO 14: CPT | Performed by: INTERNAL MEDICINE

## 2022-03-14 PROCEDURE — 83036 HEMOGLOBIN GLYCOSYLATED A1C: CPT

## 2022-03-14 NOTE — PROGRESS NOTES
[facility]     Rachel Dale  1955   [id number]      Patient Care Team:  Jer Richardson MD as PCP - General (Internal Medicine)    Chief Complaint::   Chief Complaint   Patient presents with   • Hypertension   • Hyperlipidemia   • Diabetes        HPI  The patient comes in for follow-up of diabetes, hypertension, hyperlipidemia, and vitamin D deficiency.    Diabetes  The patient states she was unable to obtain the Jardiance that was prescribed due to insurance issues. The patient has lost 3 pounds noting that she tries to lose weight occasionally. She denies any breathing trouble or chest pain. She states her energy levels are good. She denies any numbness or tingling in her legs.    Skin lesion on leg  She notes that she hit the area on her leg with a weed eater approximately 3 years ago. The area scabs over. She has been applying Polysporin to the area.     Skin tag on neck  She notes that the skin tag on her neck is painful.       Chronic Conditions:      Patient Active Problem List   Diagnosis   • Allergic rhinitis   • Diabetes mellitus, type 2 (HCC)   • Peripheral neuropathy   • Idiopathic peripheral neuropathy   • Mixed hyperlipidemia   • Obesity   • Osteoporosis   • Vitamin D deficiency   • Essential hypertension        No past medical history on file.    Past Surgical History:   Procedure Laterality Date   • BREAST SURGERY      BREAST REDUCTION    • CHOLECYSTECTOMY  2009       Family History   Problem Relation Age of Onset   • Hypertension Mother    • Stroke Father    • Colon cancer Brother 74   • Breast cancer Maternal Aunt    • Coronary artery disease Maternal Grandfather        Social History     Socioeconomic History   • Marital status:    Tobacco Use   • Smoking status: Never Smoker   • Smokeless tobacco: Never Used   Vaping Use   • Vaping Use: Never used   Substance and Sexual Activity   • Alcohol use: Yes     Alcohol/week: 1.0 standard drink     Types: 1 Glasses of  "wine per week     Comment: yearly   • Drug use: Defer   • Sexual activity: Defer       Allergies   Allergen Reactions   • Sulfa Antibiotics Other (See Comments)     OTHER         Current Outpatient Medications:   •  Acetaminophen (TYLENOL ARTHRITIS PAIN PO), Take 2 tablets by mouth Daily., Disp: , Rfl:   •  aspirin 81 MG EC tablet, Take 81 mg by mouth Daily., Disp: , Rfl:   •  calcium carbonate (OS-ALTAGRACIA) 600 MG tablet, Take 600 mg by mouth Daily., Disp: , Rfl:   •  cetirizine (zyrTEC) 10 MG tablet, Take 10 mg by mouth Daily., Disp: , Rfl:   •  glimepiride (AMARYL) 4 MG tablet, TAKE 1 TABLET TWICE A DAY, Disp: 180 tablet, Rfl: 3  •  lisinopril (PRINIVIL,ZESTRIL) 20 MG tablet, TAKE 1 TABLET DAILY, Disp: 90 tablet, Rfl: 3  •  Lutein-Zeaxanthin 25-5 MG capsule, Take 1 capsule by mouth Daily., Disp: , Rfl:   •  metFORMIN (GLUCOPHAGE) 1000 MG tablet, TAKE 1 TABLET TWICE A DAY WITH MEALS, Disp: 180 tablet, Rfl: 3  •  Omega-3 Fatty Acids (FISH OIL ULTRA) 1400 MG capsule, Take 1 capsule by mouth Daily., Disp: , Rfl:   •  omeprazole (priLOSEC) 20 MG capsule, Take 20 mg by mouth Daily., Disp: , Rfl:   •  simvastatin (ZOCOR) 20 MG tablet, TAKE 1 TABLET EVERY EVENING, Disp: 90 tablet, Rfl: 3  •  vitamin C (ASCORBIC ACID) 250 MG tablet, Take 500 mg by mouth Daily., Disp: , Rfl:   •  Vitamin D, Ergocalciferol, 2000 units capsule, Take 1 capsule by mouth Daily., Disp: , Rfl:   •  vitamin E 400 UNIT capsule, Take 400 Units by mouth Daily., Disp: , Rfl:     Review of Systems    A review of systems was performed, and positive findings are noted in the HPI.    Vital Signs  Vitals:    03/14/22 0807   BP: 126/80   BP Location: Left arm   Patient Position: Sitting   Cuff Size: Adult   Pulse: 76   Temp: 97.8 °F (36.6 °C)   Weight: 70.7 kg (155 lb 12.8 oz)   Height: 151.1 cm (59.49\")   PainSc: 0-No pain       Physical Exam  Vitals reviewed.   Constitutional:       Appearance: She is well-developed.   HENT:      Head: Normocephalic and " atraumatic.   Cardiovascular:      Rate and Rhythm: Normal rate and regular rhythm.      Heart sounds: Normal heart sounds. No murmur heard.  Pulmonary:      Effort: Pulmonary effort is normal.      Breath sounds: Normal breath sounds.   Neurological:      Mental Status: She is alert and oriented to person, place, and time.          Skin Tag Removal    Date/Time: 3/14/2022 8:46 AM  Performed by: Jer Richardson MD  Authorized by: Jer Richardson MD   Comments: 2 inflamed skin tags on the anterior neck were treated with cryotherapy without complication.          ACE III MINI             Assessment/Plan:    Diagnoses and all orders for this visit:    1. Type 2 diabetes mellitus with diabetic polyneuropathy, without long-term current use of insulin (HCC) (Primary)  -     Comprehensive Metabolic Panel; Future  -     Hemoglobin A1c; Future  - Last hemoglobin A1c was 7.5 and because that was a significant worsening, I had prescribed Jardiance; however, her insurance did not cover it and she has not taken it, so she has been on the same regimen of metformin and glimepiride.  - If hemoglobin A1c is not significantly better, would consider pioglitazone or another older agent.    2. Essential hypertension  - Blood pressure is well controlled on lisinopril.    3. Mixed hyperlipidemia  -     Lipid Panel; Future  - Lipid panel is pending.  - She will continue simvastatin and a healthy diet.    4. Vitamin D deficiency  -     Vitamin D 25 Hydroxy; Future  - Vitamin D level is pending.  - She will continue taking 2000 units of vitamin D daily.     5. Inflamed skin tag  -     Skin Tag Removal    6. GERD  - She is trying to wean off omeprazole. Currently, she is on 20 mg every other day. I encouraged her to continue that dose for a full month and then go to every 3rd day and consider using famotidine if she needs treatment    Follow up in 6 months.    Plan of care reviewed with patient at the conclusion of today's  visit. Education was provided regarding diagnosis, management, and any prescribed or recommended OTC medications.Patient verbalizes understanding of and agreement with management plan.         Jer Richardson MD           Answers for HPI/ROS submitted by the patient on 3/8/2022  What is the primary reason for your visit?: Physical    Transcribed from ambient dictation for Jer Richardson MD by Alisson Edouard

## 2022-03-16 ENCOUNTER — TELEPHONE (OUTPATIENT)
Dept: INTERNAL MEDICINE | Facility: CLINIC | Age: 67
End: 2022-03-16

## 2022-03-16 NOTE — TELEPHONE ENCOUNTER
----- Message from Jer Richardson MD sent at 3/16/2022  8:53 AM EDT -----  Please let her know that her A1c is 9.2.  That is extremely high.  I was very concerned last time when it was over 7.  We can do one of two things.  One would be to go ahead and start insulin.  The other would be to wait until her insurance changes next month, then try to get Jardiance or Trulicity covered. The other results were about the same except for triglycerides, which where 348.  This follows glucose, so the treatment is to get the sugar down.

## 2022-05-09 RX ORDER — LISINOPRIL 20 MG/1
20 TABLET ORAL DAILY
Qty: 90 TABLET | Refills: 3 | Status: SHIPPED | OUTPATIENT
Start: 2022-05-09

## 2022-06-02 ENCOUNTER — PATIENT MESSAGE (OUTPATIENT)
Dept: INTERNAL MEDICINE | Facility: CLINIC | Age: 67
End: 2022-06-02

## 2022-06-02 RX ORDER — GLIMEPIRIDE 4 MG/1
4 TABLET ORAL 2 TIMES DAILY
Qty: 180 TABLET | Refills: 3 | Status: SHIPPED | OUTPATIENT
Start: 2022-06-02

## 2022-06-02 NOTE — TELEPHONE ENCOUNTER
Rx Refill Note  Requested Prescriptions     Pending Prescriptions Disp Refills   • glimepiride (AMARYL) 4 MG tablet 180 tablet 3     Sig: Take 1 tablet by mouth 2 (Two) Times a Day.   • metFORMIN (GLUCOPHAGE) 1000 MG tablet 180 tablet 3     Sig: Take 1 tablet by mouth 2 (Two) Times a Day With Meals.      Last office visit with prescribing clinician: 3/14/2022      Next office visit with prescribing clinician: 9/14/2022            Earnestine Kitchen LPN  06/02/22, 13:03 EDT

## 2022-06-02 NOTE — TELEPHONE ENCOUNTER
From: Rachel Dale  To: Jer Richardson MD  Sent: 6/2/2022 12:55 PM EDT  Subject: Perscription renewal    I need my Glimepiride 4mg renewed for 90 days and my   Metformin Hcl 1000mg also for 90 days. At [facility].    Thank-you

## 2022-06-22 RX ORDER — DAPAGLIFLOZIN 5 MG/1
5 TABLET, FILM COATED ORAL DAILY
Qty: 30 TABLET | Refills: 5 | Status: SHIPPED | OUTPATIENT
Start: 2022-06-22 | End: 2022-09-14 | Stop reason: SDUPTHER

## 2022-09-13 ENCOUNTER — TELEPHONE (OUTPATIENT)
Dept: INTERNAL MEDICINE | Facility: CLINIC | Age: 67
End: 2022-09-13

## 2022-09-13 DIAGNOSIS — E55.9 VITAMIN D DEFICIENCY: ICD-10-CM

## 2022-09-13 DIAGNOSIS — E11.42 TYPE 2 DIABETES MELLITUS WITH DIABETIC POLYNEUROPATHY, WITHOUT LONG-TERM CURRENT USE OF INSULIN: ICD-10-CM

## 2022-09-13 DIAGNOSIS — I10 ESSENTIAL HYPERTENSION: ICD-10-CM

## 2022-09-13 DIAGNOSIS — E78.2 MIXED HYPERLIPIDEMIA: Primary | ICD-10-CM

## 2022-09-13 NOTE — TELEPHONE ENCOUNTER
Caller: Rachel Dale    Relationship: Self    Best call back number: [phone]    What orders are you requesting (i.e. lab or imaging): LABS    In what timeframe would the patient need to come in: TOMORROW    Where will you receive your lab/imaging services: OUR OFFICE

## 2022-09-14 ENCOUNTER — LAB (OUTPATIENT)
Dept: LAB | Facility: HOSPITAL | Age: 67
End: 2022-09-14

## 2022-09-14 ENCOUNTER — OFFICE VISIT (OUTPATIENT)
Dept: INTERNAL MEDICINE | Facility: CLINIC | Age: 67
End: 2022-09-14

## 2022-09-14 VITALS
HEART RATE: 80 BPM | DIASTOLIC BLOOD PRESSURE: 60 MMHG | HEIGHT: 60 IN | WEIGHT: 150.8 LBS | BODY MASS INDEX: 29.61 KG/M2 | TEMPERATURE: 97.8 F | SYSTOLIC BLOOD PRESSURE: 104 MMHG

## 2022-09-14 DIAGNOSIS — E78.2 MIXED HYPERLIPIDEMIA: ICD-10-CM

## 2022-09-14 DIAGNOSIS — E55.9 VITAMIN D DEFICIENCY: ICD-10-CM

## 2022-09-14 DIAGNOSIS — I10 ESSENTIAL HYPERTENSION: ICD-10-CM

## 2022-09-14 DIAGNOSIS — M81.0 AGE-RELATED OSTEOPOROSIS WITHOUT CURRENT PATHOLOGICAL FRACTURE: ICD-10-CM

## 2022-09-14 DIAGNOSIS — E11.42 TYPE 2 DIABETES MELLITUS WITH DIABETIC POLYNEUROPATHY, WITHOUT LONG-TERM CURRENT USE OF INSULIN: ICD-10-CM

## 2022-09-14 DIAGNOSIS — Z00.00 PREVENTATIVE HEALTH CARE: Primary | ICD-10-CM

## 2022-09-14 LAB
25(OH)D3 SERPL-MCNC: 47.5 NG/ML (ref 30–100)
ALBUMIN SERPL-MCNC: 5 G/DL (ref 3.5–5.2)
ALBUMIN UR-MCNC: 1.3 MG/DL
ALBUMIN/GLOB SERPL: 1.9 G/DL
ALP SERPL-CCNC: 75 U/L (ref 39–117)
ALT SERPL W P-5'-P-CCNC: 10 U/L (ref 1–33)
ANION GAP SERPL CALCULATED.3IONS-SCNC: 14.4 MMOL/L (ref 5–15)
AST SERPL-CCNC: 15 U/L (ref 1–32)
BASOPHILS # BLD AUTO: 0.04 10*3/MM3 (ref 0–0.2)
BASOPHILS NFR BLD AUTO: 0.5 % (ref 0–1.5)
BILIRUB SERPL-MCNC: 0.4 MG/DL (ref 0–1.2)
BUN SERPL-MCNC: 15 MG/DL (ref 8–23)
BUN/CREAT SERPL: 20.3 (ref 7–25)
CALCIUM SPEC-SCNC: 10.5 MG/DL (ref 8.6–10.5)
CHLORIDE SERPL-SCNC: 99 MMOL/L (ref 98–107)
CHOLEST SERPL-MCNC: 190 MG/DL (ref 0–200)
CO2 SERPL-SCNC: 27.6 MMOL/L (ref 22–29)
CREAT SERPL-MCNC: 0.74 MG/DL (ref 0.57–1)
CREAT UR-MCNC: 88.9 MG/DL
DEPRECATED RDW RBC AUTO: 39.3 FL (ref 37–54)
EGFRCR SERPLBLD CKD-EPI 2021: 89.9 ML/MIN/1.73
EOSINOPHIL # BLD AUTO: 0.16 10*3/MM3 (ref 0–0.4)
EOSINOPHIL NFR BLD AUTO: 2 % (ref 0.3–6.2)
ERYTHROCYTE [DISTWIDTH] IN BLOOD BY AUTOMATED COUNT: 12.8 % (ref 12.3–15.4)
GLOBULIN UR ELPH-MCNC: 2.6 GM/DL
GLUCOSE SERPL-MCNC: 139 MG/DL (ref 65–99)
HBA1C MFR BLD: 7 % (ref 4.8–5.6)
HCT VFR BLD AUTO: 38.9 % (ref 34–46.6)
HDLC SERPL-MCNC: 52 MG/DL (ref 40–60)
HGB BLD-MCNC: 12.9 G/DL (ref 12–15.9)
IMM GRANULOCYTES # BLD AUTO: 0.01 10*3/MM3 (ref 0–0.05)
IMM GRANULOCYTES NFR BLD AUTO: 0.1 % (ref 0–0.5)
LDLC SERPL CALC-MCNC: 84 MG/DL (ref 0–100)
LDLC/HDLC SERPL: 1.38 {RATIO}
LYMPHOCYTES # BLD AUTO: 3.97 10*3/MM3 (ref 0.7–3.1)
LYMPHOCYTES NFR BLD AUTO: 50.4 % (ref 19.6–45.3)
MCH RBC QN AUTO: 27.9 PG (ref 26.6–33)
MCHC RBC AUTO-ENTMCNC: 33.2 G/DL (ref 31.5–35.7)
MCV RBC AUTO: 84.2 FL (ref 79–97)
MICROALBUMIN/CREAT UR: 14.6 MG/G
MONOCYTES # BLD AUTO: 0.69 10*3/MM3 (ref 0.1–0.9)
MONOCYTES NFR BLD AUTO: 8.8 % (ref 5–12)
NEUTROPHILS NFR BLD AUTO: 3.01 10*3/MM3 (ref 1.7–7)
NEUTROPHILS NFR BLD AUTO: 38.2 % (ref 42.7–76)
NRBC BLD AUTO-RTO: 0 /100 WBC (ref 0–0.2)
PLATELET # BLD AUTO: 378 10*3/MM3 (ref 140–450)
PMV BLD AUTO: 9.3 FL (ref 6–12)
POTASSIUM SERPL-SCNC: 4.1 MMOL/L (ref 3.5–5.2)
PROT SERPL-MCNC: 7.6 G/DL (ref 6–8.5)
RBC # BLD AUTO: 4.62 10*6/MM3 (ref 3.77–5.28)
SODIUM SERPL-SCNC: 141 MMOL/L (ref 136–145)
TRIGL SERPL-MCNC: 330 MG/DL (ref 0–150)
VLDLC SERPL-MCNC: 54 MG/DL (ref 5–40)
WBC NRBC COR # BLD: 7.88 10*3/MM3 (ref 3.4–10.8)

## 2022-09-14 PROCEDURE — 1159F MED LIST DOCD IN RCRD: CPT | Performed by: INTERNAL MEDICINE

## 2022-09-14 PROCEDURE — 80061 LIPID PANEL: CPT

## 2022-09-14 PROCEDURE — 82043 UR ALBUMIN QUANTITATIVE: CPT

## 2022-09-14 PROCEDURE — G0439 PPPS, SUBSEQ VISIT: HCPCS | Performed by: INTERNAL MEDICINE

## 2022-09-14 PROCEDURE — 99397 PER PM REEVAL EST PAT 65+ YR: CPT | Performed by: INTERNAL MEDICINE

## 2022-09-14 PROCEDURE — 85025 COMPLETE CBC W/AUTO DIFF WBC: CPT

## 2022-09-14 PROCEDURE — 1170F FXNL STATUS ASSESSED: CPT | Performed by: INTERNAL MEDICINE

## 2022-09-14 PROCEDURE — 82306 VITAMIN D 25 HYDROXY: CPT

## 2022-09-14 PROCEDURE — 83036 HEMOGLOBIN GLYCOSYLATED A1C: CPT

## 2022-09-14 PROCEDURE — 80053 COMPREHEN METABOLIC PANEL: CPT

## 2022-09-14 PROCEDURE — 82570 ASSAY OF URINE CREATININE: CPT

## 2022-09-14 RX ORDER — DAPAGLIFLOZIN 5 MG/1
5 TABLET, FILM COATED ORAL DAILY
Qty: 90 TABLET | Refills: 3 | Status: SHIPPED | OUTPATIENT
Start: 2022-09-14

## 2022-09-14 NOTE — PROGRESS NOTES
QUICK REFERENCE INFORMATION:  The ABCs of the Annual Wellness Visit    [facility]    HEALTH RISK ASSESSMENT    1955    Recent Hospitalizations:  No hospitalization(s) within the last year..      Current Medical Providers:  Patient Care Team:  Jer Richardson MD as PCP - General (Internal Medicine)      Smoking Status:  Social History     Tobacco Use   Smoking Status Never Smoker   Smokeless Tobacco Never Used       Alcohol Consumption:  Social History     Substance and Sexual Activity   Alcohol Use Yes   • Alcohol/week: 1.0 standard drink   • Types: 1 Glasses of wine per week    Comment: yearly       Depression Screen:   PHQ-2/PHQ-9 Depression Screening 9/14/2022   Retired Total Score -   Little Interest or Pleasure in Doing Things 0-->not at all   Feeling Down, Depressed or Hopeless 0-->not at all   PHQ-9: Brief Depression Severity Measure Score 0       Health Habits and Functional and Cognitive Screening:  Functional & Cognitive Status 9/14/2022   Do you have difficulty preparing food and eating? No   Do you have difficulty bathing yourself, getting dressed or grooming yourself? No   Do you have difficulty using the toilet? No   Do you have difficulty moving around from place to place? No   Do you have trouble with steps or getting out of a bed or a chair? No   Current Diet Unhealthy Diet   Dental Exam Up to date   Eye Exam Up to date   Exercise (times per week) 0 times per week   Current Exercises Include No Regular Exercise   Do you need help using the phone?  No   Are you deaf or do you have serious difficulty hearing?  No   Do you need help with transportation? No   Do you need help shopping? No   Do you need help preparing meals?  No   Do you need help with housework?  No   Do you need help with laundry? No   Do you need help taking your medications? No   Do you need help managing money? No   Do you ever drive or ride in a car without wearing a seat belt? No   Have you felt unusual  stress, anger or loneliness in the last month? No   Who do you live with? Other   If you need help, do you have trouble finding someone available to you? No   Have you been bothered in the last four weeks by sexual problems? No   Do you have difficulty concentrating, remembering or making decisions? No       Fall Risk Screen:  NANNETTE Fall Risk Assessment was completed, and patient is at LOW risk for falls.Assessment completed on:9/14/2022    ACE III MINI        Does the patient have evidence of cognitive impairment? No    Aspirin use counseling? Taking ASA appropriately as indicated      Recent Lab Results:  CMP:  Lab Results   Component Value Date    BUN 15 09/14/2022    CREATININE 0.74 09/14/2022    EGFRIFNONA 74 09/14/2021    BCR 20.3 09/14/2022     09/14/2022    K 4.1 09/14/2022    CO2 27.6 09/14/2022    CALCIUM 10.5 09/14/2022    ALBUMIN 5.00 09/14/2022    BILITOT 0.4 09/14/2022    ALKPHOS 75 09/14/2022    AST 15 09/14/2022    ALT 10 09/14/2022     HbA1c:  Lab Results   Component Value Date    HGBA1C 7.00 (H) 09/14/2022    HGBA1C 9.20 (H) 03/14/2022     Microalbumin:  Lab Results   Component Value Date    MICROALBUR 1.3 09/14/2022     Lipid Panel  Lab Results   Component Value Date    CHOL 190 09/14/2022    TRIG 330 (H) 09/14/2022    HDL 52 09/14/2022    LDL 84 09/14/2022    AST 15 09/14/2022    ALT 10 09/14/2022       Visual Acuity:   Visual Acuity Screening    Right eye Left eye Both eyes   Without correction: 20/25 20/25 20/25   With correction:          Age-appropriate Screening Schedule:  Refer to the list below for future screening recommendations based on patient's age, sex and/or medical conditions. Orders for these recommended tests are listed in the plan section. The patient has been provided with a written plan.    Health Maintenance   Topic Date Due   • DXA SCAN  Never done   • PAP SMEAR  Never done   • TDAP/TD VACCINES (2 - Td or Tdap) 03/25/2019   • DIABETIC EYE EXAM  09/02/2022   • DIABETIC  FOOT EXAM  09/14/2022   • INFLUENZA VACCINE  10/01/2022   • HEMOGLOBIN A1C  03/14/2023   • LIPID PANEL  09/14/2023   • URINE MICROALBUMIN  09/14/2023   • MAMMOGRAM  09/13/2024   • ZOSTER VACCINE  Completed        Subjective   History of Present Illness    Rachel Dale is a 67 y.o. female an established patient presenting for a Welcome to Medicare Visit and for follow-up of diabetes, hypertension, hyperlipidemia, vitamin D deficiency, and osteoporosis.    Hypertension  She reports she is feeling good. Her blood pressure is well controlled on lisinopril.    Weight loss  She has lost some weight since her last visit.    Health maintenance  She states she has not had her eyes checked recently. She claims she had a bone density test a long time ago.  She states she recently had a mammogram and an ultrasound. She was told that she may have a cyst or an injury that she hit herself and did not realize it. She is up to date on her colonoscopy. She is due for a repeat colonoscopy in 5 years. She is up to date on mammography, although she had to have an ultrasound for follow-up, which was found to be a completely benign lesion. She claims her energy level is okay. She states the only exercise she gets is from working around the house.     Diabetes  Her A1c is much improved at 7.0. She is doing better with diet. She will continue Farxiga, glimepiride, metformin, and a healthy diet.    Vitamin D deficiency  She continues on vitamin D supplement.    Health maintenance  She is due for the COVID booster. She is due for the flu vaccine in 10/2022. She denies any shortness of breath, chest pain, or weakness.    CHRONIC CONDITIONS    The following portions of the patient's history were reviewed and updated as appropriate: allergies, current medications, past family history, past medical history, past social history, past surgical history and problem list.    Outpatient Medications Prior to Visit   Medication Sig Dispense Refill   •  Acetaminophen (TYLENOL ARTHRITIS PAIN PO) Take 2 tablets by mouth Daily.     • aspirin 81 MG EC tablet Take 81 mg by mouth Daily.     • calcium carbonate (OS-ALTAGRACIA) 600 MG tablet Take 600 mg by mouth Daily.     • cetirizine (zyrTEC) 10 MG tablet Take 10 mg by mouth Daily.     • glimepiride (AMARYL) 4 MG tablet Take 1 tablet by mouth 2 (Two) Times a Day. 180 tablet 3   • lisinopril (PRINIVIL,ZESTRIL) 20 MG tablet Take 1 tablet by mouth Daily. 90 tablet 3   • Lutein-Zeaxanthin 25-5 MG capsule Take 1 capsule by mouth Daily.     • metFORMIN (GLUCOPHAGE) 1000 MG tablet Take 1 tablet by mouth 2 (Two) Times a Day With Meals. 180 tablet 3   • Omega-3 Fatty Acids (FISH OIL ULTRA) 1400 MG capsule Take 1 capsule by mouth Daily.     • omeprazole (priLOSEC) 20 MG capsule Take 20 mg by mouth Every Other Day.     • simvastatin (ZOCOR) 20 MG tablet TAKE 1 TABLET EVERY EVENING 90 tablet 3   • Vitamin D, Ergocalciferol, 2000 units capsule Take 1 capsule by mouth Daily.     • vitamin E 400 UNIT capsule Take 400 Units by mouth Daily.     • dapagliflozin (Farxiga) 5 MG tablet tablet Take 1 tablet by mouth Daily. 30 tablet 5   • vitamin C (ASCORBIC ACID) 250 MG tablet Take 500 mg by mouth Daily.       No facility-administered medications prior to visit.       Patient Active Problem List   Diagnosis   • Allergic rhinitis   • Diabetes mellitus, type 2 (HCC)   • Peripheral neuropathy   • Idiopathic peripheral neuropathy   • Mixed hyperlipidemia   • Obesity   • Osteoporosis   • Vitamin D deficiency   • Essential hypertension       Advance Care Planning:  ACP discussion was held with the patient during this visit. Patient has an advance directive (not in EMR), copy requested.    Identification of Risk Factors:  Risk factors include: Advance Directive Discussion.    Review of Systems   Constitutional: Negative for chills, fatigue and fever.   HENT: Negative for congestion, ear pain and sinus pressure.    Respiratory: Negative for cough, chest  tightness, shortness of breath and wheezing.    Cardiovascular: Negative for chest pain and palpitations.   Gastrointestinal: Negative for abdominal pain, blood in stool and constipation.   Skin: Negative for color change.   Allergic/Immunologic: Negative for environmental allergies.   Neurological: Negative for dizziness, speech difficulty and headaches.   Psychiatric/Behavioral: Negative for confusion. The patient is not nervous/anxious.        Compared to one year ago, the patient feels her physical health is the same.  Compared to one year ago, the patient feels her mental health is the same.    Objective    Physical Exam  Vitals reviewed.   Constitutional:       Appearance: Normal appearance. She is well-developed.   HENT:      Head: Normocephalic and atraumatic.      Right Ear: Hearing, tympanic membrane, ear canal and external ear normal.      Left Ear: Hearing, tympanic membrane, ear canal and external ear normal.      Nose: Nose normal.      Mouth/Throat:      Pharynx: Uvula midline.   Eyes:      General: Lids are normal.      Conjunctiva/sclera: Conjunctivae normal.      Pupils: Pupils are equal, round, and reactive to light.   Cardiovascular:      Rate and Rhythm: Normal rate and regular rhythm.      Heart sounds: Normal heart sounds.   Pulmonary:      Effort: Pulmonary effort is normal.      Breath sounds: Normal breath sounds.   Abdominal:      General: Bowel sounds are normal.      Palpations: Abdomen is soft.   Musculoskeletal:         General: Normal range of motion.      Cervical back: Full passive range of motion without pain, normal range of motion and neck supple.   Skin:     General: Skin is warm and dry.   Neurological:      Mental Status: She is alert and oriented to person, place, and time.      Deep Tendon Reflexes: Reflexes are normal and symmetric.   Psychiatric:         Speech: Speech normal.         Behavior: Behavior normal.         Thought Content: Thought content normal.          "Judgment: Judgment normal.          Vitals:    09/14/22 1406   BP: 104/60   BP Location: Right arm   Patient Position: Sitting   Cuff Size: Large Adult   Pulse: 80   Temp: 97.8 °F (36.6 °C)   Weight: 68.4 kg (150 lb 12.8 oz)   Height: 152.4 cm (60\")   PainSc: 0-No pain       BMI is >= 25 and <30. (Overweight) The following options were offered after discussion;: exercise counseling/recommendations and nutrition counseling/recommendations      Procedure   Procedures       Assessment & Plan   Problem List Items Addressed This Visit        Cardiac and Vasculature    Mixed hyperlipidemia    Relevant Medications    simvastatin (ZOCOR) 20 MG tablet    Essential hypertension    Relevant Medications    lisinopril (PRINIVIL,ZESTRIL) 20 MG tablet       Endocrine and Metabolic    Diabetes mellitus, type 2 (HCC)    Relevant Medications    glimepiride (AMARYL) 4 MG tablet    metFORMIN (GLUCOPHAGE) 1000 MG tablet    dapagliflozin (Farxiga) 5 MG tablet tablet    Vitamin D deficiency       Musculoskeletal and Injuries    Osteoporosis    Relevant Orders    DEXA Bone Density Axial      Other Visit Diagnoses     Preventative health care    -  Primary      1. Prevention  - Overall she is doing better. She is exercising more and has lost weight. I encouraged her to be more intentional about the maintenance of physical strength. Dual-energy X-ray absorptiometry scan is scheduled. Colonoscopy was done in 08/2022, due at 5 years. She is up to date on mammography, although she had to have an ultrasound for follow-up, but that was found to be a completely benign lesion.    2. Diabetes  - A1c is much improved at 7.0. She is doing better with diet. She will continue Farxiga, glimepiride, metformin, and a healthy diet.    3. Hypertension  - Blood pressure is well controlled on lisinopril.    4. Hyperlipidemia  - Lipid panel is pending. Continue simvastatin, a healthy diet.    5. Vitamin D deficiency  - Vitamin D level is pending. Continue " current vitamin D supplement.    6. Osteoporosis  - Dual-energy X-ray absorptiometry is scheduled.    She will follow up in 6 months.  Patient Self-Management and Personalized Health Advice  The patient has been provided with information about: diet, exercise and weight management and preventive services including:   · Annual Wellness Visit (AWV).    Outpatient Encounter Medications as of 9/14/2022   Medication Sig Dispense Refill   • Acetaminophen (TYLENOL ARTHRITIS PAIN PO) Take 2 tablets by mouth Daily.     • aspirin 81 MG EC tablet Take 81 mg by mouth Daily.     • calcium carbonate (OS-ALTAGRACIA) 600 MG tablet Take 600 mg by mouth Daily.     • cetirizine (zyrTEC) 10 MG tablet Take 10 mg by mouth Daily.     • dapagliflozin (Farxiga) 5 MG tablet tablet Take 1 tablet by mouth Daily. 90 tablet 3   • glimepiride (AMARYL) 4 MG tablet Take 1 tablet by mouth 2 (Two) Times a Day. 180 tablet 3   • lisinopril (PRINIVIL,ZESTRIL) 20 MG tablet Take 1 tablet by mouth Daily. 90 tablet 3   • Lutein-Zeaxanthin 25-5 MG capsule Take 1 capsule by mouth Daily.     • metFORMIN (GLUCOPHAGE) 1000 MG tablet Take 1 tablet by mouth 2 (Two) Times a Day With Meals. 180 tablet 3   • Omega-3 Fatty Acids (FISH OIL ULTRA) 1400 MG capsule Take 1 capsule by mouth Daily.     • omeprazole (priLOSEC) 20 MG capsule Take 20 mg by mouth Every Other Day.     • simvastatin (ZOCOR) 20 MG tablet TAKE 1 TABLET EVERY EVENING 90 tablet 3   • Vitamin D, Ergocalciferol, 2000 units capsule Take 1 capsule by mouth Daily.     • vitamin E 400 UNIT capsule Take 400 Units by mouth Daily.     • [DISCONTINUED] dapagliflozin (Farxiga) 5 MG tablet tablet Take 1 tablet by mouth Daily. 30 tablet 5   • [DISCONTINUED] vitamin C (ASCORBIC ACID) 250 MG tablet Take 500 mg by mouth Daily.       No facility-administered encounter medications on file as of 9/14/2022.       Reviewed use of high risk medication in the elderly: yes  Reviewed for potential of harmful drug interactions in the  elderly: yes    Follow Up:  Return in about 6 months (around 3/14/2023) for follow up.     There are no Patient Instructions on file for this visit.    An After Visit Summary and PPPS with all of these plans were given to the patient.      Transcribed from ambient dictation for Jer Richardson MD by Elizabeth Matthew.  09/14/22   16:15 EDT    Patient verbalized consent to the visit recording.

## 2022-11-07 ENCOUNTER — HOSPITAL ENCOUNTER (OUTPATIENT)
Dept: BONE DENSITY | Facility: HOSPITAL | Age: 67
Discharge: HOME | End: 2022-11-07
Admitting: INTERNAL MEDICINE

## 2022-11-07 DIAGNOSIS — M81.0 AGE-RELATED OSTEOPOROSIS WITHOUT CURRENT PATHOLOGICAL FRACTURE: ICD-10-CM

## 2022-11-07 PROCEDURE — 77080 DXA BONE DENSITY AXIAL: CPT

## 2022-12-01 ENCOUNTER — TELEPHONE (OUTPATIENT)
Dept: INTERNAL MEDICINE | Facility: CLINIC | Age: 67
End: 2022-12-01

## 2022-12-01 NOTE — TELEPHONE ENCOUNTER
[facility] WITH [facility] CALLED TO PROVIDE A MEDICATION RECOMMENDATION    PATIENT MAY HAVE DIABETES BUT DOES NOT TAKE STATIN DRUG. [facility] RECOMMENDED PATIENTS 40-75 YEARS OLD WITH DIABETIES SHOULD TAKE A STATIN DRUG TO DECREASE CARDIOVASCULAR RISK. [facility] RECOMMENDS A STATIN MEDICATION. PLEASE ADVISE AND CALL [facility] WITH ANY QUESTIONS [phone]

## 2022-12-07 RX ORDER — SIMVASTATIN 20 MG
20 TABLET ORAL EVERY EVENING
Qty: 90 TABLET | Refills: 3 | Status: SHIPPED | OUTPATIENT
Start: 2022-12-07

## 2023-03-15 ENCOUNTER — OFFICE VISIT (OUTPATIENT)
Dept: INTERNAL MEDICINE | Facility: CLINIC | Age: 68
End: 2023-03-15
Payer: MEDICARE

## 2023-03-15 ENCOUNTER — LAB (OUTPATIENT)
Dept: LAB | Facility: HOSPITAL | Age: 68
End: 2023-03-15
Payer: MEDICARE

## 2023-03-15 VITALS
HEIGHT: 60 IN | HEART RATE: 72 BPM | DIASTOLIC BLOOD PRESSURE: 62 MMHG | TEMPERATURE: 98.4 F | SYSTOLIC BLOOD PRESSURE: 132 MMHG | BODY MASS INDEX: 29.45 KG/M2 | WEIGHT: 150 LBS

## 2023-03-15 DIAGNOSIS — M81.0 AGE-RELATED OSTEOPOROSIS WITHOUT CURRENT PATHOLOGICAL FRACTURE: ICD-10-CM

## 2023-03-15 DIAGNOSIS — E11.42 TYPE 2 DIABETES MELLITUS WITH DIABETIC POLYNEUROPATHY, WITHOUT LONG-TERM CURRENT USE OF INSULIN: ICD-10-CM

## 2023-03-15 DIAGNOSIS — E11.42 TYPE 2 DIABETES MELLITUS WITH DIABETIC POLYNEUROPATHY, WITHOUT LONG-TERM CURRENT USE OF INSULIN: Primary | ICD-10-CM

## 2023-03-15 DIAGNOSIS — E78.2 MIXED HYPERLIPIDEMIA: ICD-10-CM

## 2023-03-15 DIAGNOSIS — I10 ESSENTIAL HYPERTENSION: ICD-10-CM

## 2023-03-15 LAB
25(OH)D3 SERPL-MCNC: 61.6 NG/ML (ref 30–100)
ALBUMIN SERPL-MCNC: 4.6 G/DL (ref 3.5–5.2)
ALBUMIN/GLOB SERPL: 1.6 G/DL
ALP SERPL-CCNC: 66 U/L (ref 39–117)
ALT SERPL W P-5'-P-CCNC: 15 U/L (ref 1–33)
ANION GAP SERPL CALCULATED.3IONS-SCNC: 11 MMOL/L (ref 5–15)
AST SERPL-CCNC: 13 U/L (ref 1–32)
BILIRUB SERPL-MCNC: 0.3 MG/DL (ref 0–1.2)
BUN SERPL-MCNC: 16 MG/DL (ref 8–23)
BUN/CREAT SERPL: 20.8 (ref 7–25)
CALCIUM SPEC-SCNC: 10.1 MG/DL (ref 8.6–10.5)
CHLORIDE SERPL-SCNC: 98 MMOL/L (ref 98–107)
CHOLEST SERPL-MCNC: 197 MG/DL (ref 0–200)
CO2 SERPL-SCNC: 27 MMOL/L (ref 22–29)
CREAT SERPL-MCNC: 0.77 MG/DL (ref 0.57–1)
EGFRCR SERPLBLD CKD-EPI 2021: 85.7 ML/MIN/1.73
EXPIRATION DATE: NORMAL
GLOBULIN UR ELPH-MCNC: 2.8 GM/DL
GLUCOSE SERPL-MCNC: 172 MG/DL (ref 65–99)
HBA1C MFR BLD: 7.3 %
HDLC SERPL-MCNC: 52 MG/DL (ref 40–60)
LDLC SERPL CALC-MCNC: 89 MG/DL (ref 0–100)
LDLC/HDLC SERPL: 1.47 {RATIO}
Lab: NORMAL
POTASSIUM SERPL-SCNC: 4.4 MMOL/L (ref 3.5–5.2)
PROT SERPL-MCNC: 7.4 G/DL (ref 6–8.5)
SODIUM SERPL-SCNC: 136 MMOL/L (ref 136–145)
TRIGL SERPL-MCNC: 343 MG/DL (ref 0–150)
VLDLC SERPL-MCNC: 56 MG/DL (ref 5–40)

## 2023-03-15 PROCEDURE — 1159F MED LIST DOCD IN RCRD: CPT | Performed by: INTERNAL MEDICINE

## 2023-03-15 PROCEDURE — 80061 LIPID PANEL: CPT

## 2023-03-15 PROCEDURE — 99214 OFFICE O/P EST MOD 30 MIN: CPT | Performed by: INTERNAL MEDICINE

## 2023-03-15 PROCEDURE — 82306 VITAMIN D 25 HYDROXY: CPT

## 2023-03-15 PROCEDURE — 83036 HEMOGLOBIN GLYCOSYLATED A1C: CPT | Performed by: INTERNAL MEDICINE

## 2023-03-15 PROCEDURE — 3051F HG A1C>EQUAL 7.0%<8.0%: CPT | Performed by: INTERNAL MEDICINE

## 2023-03-15 PROCEDURE — 80053 COMPREHEN METABOLIC PANEL: CPT

## 2023-03-15 PROCEDURE — 3078F DIAST BP <80 MM HG: CPT | Performed by: INTERNAL MEDICINE

## 2023-03-15 PROCEDURE — 3075F SYST BP GE 130 - 139MM HG: CPT | Performed by: INTERNAL MEDICINE

## 2023-03-15 NOTE — PROGRESS NOTES
Capillary Blood Specimen Collection  Capillary blood collection performed in right hand by Esther Emery MA. Patient tolerated the procedure well without complications.   03/15/23   Esther Emery MA    Answers for HPI/ROS submitted by the patient on 3/8/2023  What is the primary reason for your visit?: Physical

## 2023-03-15 NOTE — PROGRESS NOTES
[facility]     Rachel Dale  1955   [id number]      Patient Care Team:  Jer Richardson MD as PCP - General (Internal Medicine)    Chief Complaint::   Chief Complaint   Patient presents with   • Hypertension   • Hyperlipidemia   • Diabetes        HPI  The patient comes in for a follow-up for diabetes mellitus, hypertension, hyperlipidemia, and osteoporosis.    General health maintenance  The patient states that she is doing well physically. She has been doing minimal physical exercises that include walking. She weighs 150 pounds today. She has enough refills for her prescriptions. She is up to date on her COVID-19 vaccinations. She mentions that she tested positive for COVID-19 in the past with mild symptoms. She is fasting today.     Hypertension  The patient's blood pressure today is within normal limits.    Diabetes mellitus  The patient's hemoglobin A1c today is slightly elevated at 7.3 percent compared to her previous result about a year ago, which was 9.20 percent. She is taking Farxiga.      Chronic Conditions: Diabetes mellitus, hypertension, hyperlipidemia, and osteoporosis.    Patient Active Problem List   Diagnosis   • Allergic rhinitis   • Diabetes mellitus, type 2 (HCC)   • Peripheral neuropathy   • Idiopathic peripheral neuropathy   • Mixed hyperlipidemia   • Obesity   • Osteoporosis   • Vitamin D deficiency   • Essential hypertension        No past medical history on file.    Past Surgical History:   Procedure Laterality Date   • BREAST SURGERY      BREAST REDUCTION    • CHOLECYSTECTOMY  2009       Family History   Problem Relation Age of Onset   • Hypertension Mother    • Stroke Father    • Colon cancer Brother 74   • Breast cancer Maternal Aunt    • Coronary artery disease Maternal Grandfather        Social History     Socioeconomic History   • Marital status:    Tobacco Use   • Smoking status: Never   • Smokeless tobacco: Never   Vaping Use   • Vaping Use: Never  used   Substance and Sexual Activity   • Alcohol use: Yes     Alcohol/week: 1.0 standard drink     Types: 1 Glasses of wine per week     Comment: yearly   • Drug use: Defer   • Sexual activity: Defer       Allergies   Allergen Reactions   • Sulfa Antibiotics Other (See Comments)     OTHER         Current Outpatient Medications:   •  Acetaminophen (TYLENOL ARTHRITIS PAIN PO), Take 2 tablets by mouth Daily., Disp: , Rfl:   •  aspirin 81 MG EC tablet, Take 1 tablet by mouth Daily., Disp: , Rfl:   •  calcium carbonate (OS-ALTAGRACIA) 600 MG tablet, Take 1 tablet by mouth Daily., Disp: , Rfl:   •  cetirizine (zyrTEC) 10 MG tablet, Take 1 tablet by mouth Daily., Disp: , Rfl:   •  dapagliflozin (Farxiga) 5 MG tablet tablet, Take 1 tablet by mouth Daily., Disp: 90 tablet, Rfl: 3  •  glimepiride (AMARYL) 4 MG tablet, Take 1 tablet by mouth 2 (Two) Times a Day., Disp: 180 tablet, Rfl: 3  •  lisinopril (PRINIVIL,ZESTRIL) 20 MG tablet, Take 1 tablet by mouth Daily., Disp: 90 tablet, Rfl: 3  •  Lutein-Zeaxanthin 25-5 MG capsule, Take 1 capsule by mouth Daily., Disp: , Rfl:   •  metFORMIN (GLUCOPHAGE) 1000 MG tablet, Take 1 tablet by mouth 2 (Two) Times a Day With Meals., Disp: 180 tablet, Rfl: 3  •  Omega-3 Fatty Acids (FISH OIL ULTRA) 1400 MG capsule, Take 1 capsule by mouth Daily., Disp: , Rfl:   •  omeprazole (priLOSEC) 20 MG capsule, Take 1 capsule by mouth Every Other Day., Disp: , Rfl:   •  simvastatin (ZOCOR) 20 MG tablet, Take 1 tablet by mouth Every Evening., Disp: 90 tablet, Rfl: 3  •  Vitamin D, Ergocalciferol, 2000 units capsule, Take 1 capsule by mouth Daily., Disp: , Rfl:   •  vitamin E 400 UNIT capsule, Take 1 capsule by mouth Daily., Disp: , Rfl:     Review of Systems   Constitutional: Negative for chills, fatigue and fever.   HENT: Negative for congestion, ear pain and sinus pressure.    Respiratory: Negative for cough, chest tightness, shortness of breath and wheezing.    Cardiovascular: Negative for chest pain and  "palpitations.   Gastrointestinal: Negative for abdominal pain, blood in stool and constipation.   Skin: Negative for color change.   Allergic/Immunologic: Negative for environmental allergies.   Neurological: Negative for dizziness, speech difficulty and headache.   Psychiatric/Behavioral: Negative for decreased concentration. The patient is not nervous/anxious.         Vital Signs  Vitals:    03/15/23 0838   BP: 132/62   BP Location: Left arm   Patient Position: Sitting   Cuff Size: Adult   Pulse: 72   Temp: 98.4 °F (36.9 °C)   Weight: 68 kg (150 lb)   Height: 152.4 cm (60\")   PainSc: 0-No pain       Physical Exam  Vitals reviewed.   Constitutional:       Appearance: She is well-developed.   HENT:      Head: Normocephalic and atraumatic.   Eyes:      Pupils: Pupils are equal, round, and reactive to light.   Cardiovascular:      Rate and Rhythm: Normal rate and regular rhythm.      Heart sounds: Normal heart sounds.   Pulmonary:      Effort: Pulmonary effort is normal.      Breath sounds: Normal breath sounds.   Abdominal:      General: Bowel sounds are normal.      Palpations: Abdomen is soft.   Musculoskeletal:         General: Normal range of motion.      Cervical back: Normal range of motion.   Skin:     General: Skin is warm and dry.   Neurological:      Mental Status: She is alert and oriented to person, place, and time.          Procedures    ACE III MINI             Assessment/Plan:    Diagnoses and all orders for this visit:    1. Type 2 diabetes mellitus with diabetic polyneuropathy, without long-term current use of insulin (HCC) (Primary)  -     POC Glycosylated Hemoglobin (Hb A1C)  -     Comprehensive Metabolic Panel; Future    2. Essential hypertension  -     Lipid Panel; Future    3. Mixed hyperlipidemia    4. Age-related osteoporosis without current pathological fracture  -     Vitamin D,25-Hydroxy; Future        1. Diabetes mellitus  The patient's hemoglobin A1c remains well controlled at 7.3 " percent. The Farxiga has made a tremendous difference. Her hemoglobin A1c is a little higher than the last visit, but I encouraged her to continue working on a healthy diet and to continue taking Farxiga, glimepiride, and metformin.    2. Hypertension  The patient's blood pressure is well controlled on lisinopril.    3. Hyperlipidemia  The patient's lipid panel is pending. She will continue taking simvastatin and eating a healthy diet.    4. Osteoporosis  The patient's last DEXA showed osteopenia. She will continue taking calcium, and vitamin D, and walking regularly.    The patient will follow up in 6 months.    Plan of care reviewed with patient at the conclusion of today's visit. Education was provided regarding diagnosis, management, and any prescribed or recommended OTC medications.Patient verbalizes understanding of and agreement with management plan.       Jer Richardson MD     Transcribed from ambient dictation for Jer Richardson MD by Bakari Meza.  03/15/23   11:35 EDT    Patient or patient representative verbalized consent to the visit recording.  I have personally performed the services described in this document as transcribed by the above individual, and it is both accurate and complete.        Answers for HPI/ROS submitted by the patient on 3/8/2023  What is the primary reason for your visit?: Physical

## 2023-05-15 RX ORDER — GLIMEPIRIDE 4 MG/1
TABLET ORAL
Qty: 180 TABLET | Refills: 3 | Status: SHIPPED | OUTPATIENT
Start: 2023-05-15

## 2023-05-17 RX ORDER — LISINOPRIL 20 MG/1
TABLET ORAL
Qty: 90 TABLET | Refills: 3 | Status: SHIPPED | OUTPATIENT
Start: 2023-05-17

## 2023-10-03 ENCOUNTER — OFFICE VISIT (OUTPATIENT)
Dept: INTERNAL MEDICINE | Facility: CLINIC | Age: 68
End: 2023-10-03
Payer: MEDICARE

## 2023-10-03 ENCOUNTER — LAB (OUTPATIENT)
Dept: LAB | Facility: HOSPITAL | Age: 68
End: 2023-10-03
Payer: MEDICARE

## 2023-10-03 VITALS
DIASTOLIC BLOOD PRESSURE: 64 MMHG | HEART RATE: 76 BPM | WEIGHT: 148.2 LBS | TEMPERATURE: 98.2 F | BODY MASS INDEX: 29.09 KG/M2 | SYSTOLIC BLOOD PRESSURE: 110 MMHG | HEIGHT: 60 IN

## 2023-10-03 DIAGNOSIS — Z00.00 PREVENTATIVE HEALTH CARE: Primary | ICD-10-CM

## 2023-10-03 DIAGNOSIS — I10 ESSENTIAL HYPERTENSION: ICD-10-CM

## 2023-10-03 DIAGNOSIS — M81.0 AGE-RELATED OSTEOPOROSIS WITHOUT CURRENT PATHOLOGICAL FRACTURE: ICD-10-CM

## 2023-10-03 DIAGNOSIS — E55.9 VITAMIN D DEFICIENCY: ICD-10-CM

## 2023-10-03 DIAGNOSIS — E78.2 MIXED HYPERLIPIDEMIA: ICD-10-CM

## 2023-10-03 DIAGNOSIS — E11.42 TYPE 2 DIABETES MELLITUS WITH DIABETIC POLYNEUROPATHY, WITHOUT LONG-TERM CURRENT USE OF INSULIN: ICD-10-CM

## 2023-10-03 LAB
25(OH)D3 SERPL-MCNC: 55.7 NG/ML (ref 30–100)
ALBUMIN SERPL-MCNC: 4.8 G/DL (ref 3.5–5.2)
ALBUMIN UR-MCNC: 1.5 MG/DL
ALBUMIN/GLOB SERPL: 1.8 G/DL
ALP SERPL-CCNC: 63 U/L (ref 39–117)
ALT SERPL W P-5'-P-CCNC: 9 U/L (ref 1–33)
ANION GAP SERPL CALCULATED.3IONS-SCNC: 17.3 MMOL/L (ref 5–15)
AST SERPL-CCNC: 16 U/L (ref 1–32)
BASOPHILS # BLD AUTO: 0.05 10*3/MM3 (ref 0–0.2)
BASOPHILS NFR BLD AUTO: 0.6 % (ref 0–1.5)
BILIRUB SERPL-MCNC: 0.5 MG/DL (ref 0–1.2)
BUN SERPL-MCNC: 17 MG/DL (ref 8–23)
BUN/CREAT SERPL: 23.3 (ref 7–25)
CALCIUM SPEC-SCNC: 10.1 MG/DL (ref 8.6–10.5)
CHLORIDE SERPL-SCNC: 97 MMOL/L (ref 98–107)
CHOLEST SERPL-MCNC: 186 MG/DL (ref 0–200)
CO2 SERPL-SCNC: 25.7 MMOL/L (ref 22–29)
CREAT SERPL-MCNC: 0.73 MG/DL (ref 0.57–1)
CREAT UR-MCNC: 77.6 MG/DL
DEPRECATED RDW RBC AUTO: 41.3 FL (ref 37–54)
EGFRCR SERPLBLD CKD-EPI 2021: 90.8 ML/MIN/1.73
EOSINOPHIL # BLD AUTO: 0.14 10*3/MM3 (ref 0–0.4)
EOSINOPHIL NFR BLD AUTO: 1.6 % (ref 0.3–6.2)
ERYTHROCYTE [DISTWIDTH] IN BLOOD BY AUTOMATED COUNT: 13.3 % (ref 12.3–15.4)
GLOBULIN UR ELPH-MCNC: 2.7 GM/DL
GLUCOSE SERPL-MCNC: 157 MG/DL (ref 65–99)
HBA1C MFR BLD: 7.6 % (ref 4.8–5.6)
HCT VFR BLD AUTO: 38.7 % (ref 34–46.6)
HDLC SERPL-MCNC: 50 MG/DL (ref 40–60)
HGB BLD-MCNC: 12.5 G/DL (ref 12–15.9)
IMM GRANULOCYTES # BLD AUTO: 0.02 10*3/MM3 (ref 0–0.05)
IMM GRANULOCYTES NFR BLD AUTO: 0.2 % (ref 0–0.5)
LDLC SERPL CALC-MCNC: 83 MG/DL (ref 0–100)
LDLC/HDLC SERPL: 1.43 {RATIO}
LYMPHOCYTES # BLD AUTO: 3.87 10*3/MM3 (ref 0.7–3.1)
LYMPHOCYTES NFR BLD AUTO: 43.6 % (ref 19.6–45.3)
MCH RBC QN AUTO: 27.7 PG (ref 26.6–33)
MCHC RBC AUTO-ENTMCNC: 32.3 G/DL (ref 31.5–35.7)
MCV RBC AUTO: 85.6 FL (ref 79–97)
MICROALBUMIN/CREAT UR: 19.3 MG/G (ref 0–29)
MONOCYTES # BLD AUTO: 0.78 10*3/MM3 (ref 0.1–0.9)
MONOCYTES NFR BLD AUTO: 8.8 % (ref 5–12)
NEUTROPHILS NFR BLD AUTO: 4.01 10*3/MM3 (ref 1.7–7)
NEUTROPHILS NFR BLD AUTO: 45.2 % (ref 42.7–76)
NRBC BLD AUTO-RTO: 0 /100 WBC (ref 0–0.2)
PLATELET # BLD AUTO: 384 10*3/MM3 (ref 140–450)
PMV BLD AUTO: 9.3 FL (ref 6–12)
POTASSIUM SERPL-SCNC: 4.2 MMOL/L (ref 3.5–5.2)
PROT SERPL-MCNC: 7.5 G/DL (ref 6–8.5)
RBC # BLD AUTO: 4.52 10*6/MM3 (ref 3.77–5.28)
SODIUM SERPL-SCNC: 140 MMOL/L (ref 136–145)
TRIGL SERPL-MCNC: 323 MG/DL (ref 0–150)
VLDLC SERPL-MCNC: 53 MG/DL (ref 5–40)
WBC NRBC COR # BLD: 8.87 10*3/MM3 (ref 3.4–10.8)

## 2023-10-03 PROCEDURE — 3078F DIAST BP <80 MM HG: CPT | Performed by: INTERNAL MEDICINE

## 2023-10-03 PROCEDURE — 82306 VITAMIN D 25 HYDROXY: CPT

## 2023-10-03 PROCEDURE — 36415 COLL VENOUS BLD VENIPUNCTURE: CPT

## 2023-10-03 PROCEDURE — 99397 PER PM REEVAL EST PAT 65+ YR: CPT | Performed by: INTERNAL MEDICINE

## 2023-10-03 PROCEDURE — G0439 PPPS, SUBSEQ VISIT: HCPCS | Performed by: INTERNAL MEDICINE

## 2023-10-03 PROCEDURE — 80061 LIPID PANEL: CPT

## 2023-10-03 PROCEDURE — 1160F RVW MEDS BY RX/DR IN RCRD: CPT | Performed by: INTERNAL MEDICINE

## 2023-10-03 PROCEDURE — 3051F HG A1C>EQUAL 7.0%<8.0%: CPT | Performed by: INTERNAL MEDICINE

## 2023-10-03 PROCEDURE — 82043 UR ALBUMIN QUANTITATIVE: CPT

## 2023-10-03 PROCEDURE — 80053 COMPREHEN METABOLIC PANEL: CPT

## 2023-10-03 PROCEDURE — 85025 COMPLETE CBC W/AUTO DIFF WBC: CPT

## 2023-10-03 PROCEDURE — 82570 ASSAY OF URINE CREATININE: CPT

## 2023-10-03 PROCEDURE — 3074F SYST BP LT 130 MM HG: CPT | Performed by: INTERNAL MEDICINE

## 2023-10-03 PROCEDURE — 82172 ASSAY OF APOLIPOPROTEIN: CPT

## 2023-10-03 PROCEDURE — 83036 HEMOGLOBIN GLYCOSYLATED A1C: CPT

## 2023-10-03 PROCEDURE — 1159F MED LIST DOCD IN RCRD: CPT | Performed by: INTERNAL MEDICINE

## 2023-10-03 PROCEDURE — 1170F FXNL STATUS ASSESSED: CPT | Performed by: INTERNAL MEDICINE

## 2023-10-03 NOTE — PROGRESS NOTES
QUICK REFERENCE INFORMATION:  The ABCs of the Annual Wellness Visit    Subsequent Medicare Wellness Visit    HEALTH RISK ASSESSMENT    1955    Recent Hospitalizations:  No hospitalization(s) within the last year..        Current Medical Providers:  Patient Care Team:  Jer Richardson MD as PCP - General (Internal Medicine)        Smoking Status:  Social History     Tobacco Use   Smoking Status Never   Smokeless Tobacco Never       Alcohol Consumption:  Social History     Substance and Sexual Activity   Alcohol Use Not Currently    Comment: yearly       Depression Screen:       10/3/2023     8:05 AM   PHQ-2/PHQ-9 Depression Screening   Little Interest or Pleasure in Doing Things 0-->not at all   Feeling Down, Depressed or Hopeless 0-->not at all   PHQ-9: Brief Depression Severity Measure Score 0       Health Habits and Functional and Cognitive Screening:      10/3/2023     8:00 AM   Functional & Cognitive Status   Do you have difficulty preparing food and eating? No   Do you have difficulty bathing yourself, getting dressed or grooming yourself? No   Do you have difficulty using the toilet? No   Do you have difficulty moving around from place to place? No   Do you have trouble with steps or getting out of a bed or a chair? No   Current Diet Unhealthy Diet   Dental Exam Up to date   Eye Exam Up to date   Exercise (times per week) 0 times per week   Current Exercises Include No Regular Exercise   Do you need help using the phone?  No   Are you deaf or do you have serious difficulty hearing?  No   Do you need help to go to places out of walking distance? No   Do you need help shopping? No   Do you need help preparing meals?  No   Do you need help with housework?  No   Do you need help with laundry? No   Do you need help taking your medications? No   Do you need help managing money? No   Do you ever drive or ride in a car without wearing a seat belt? No   Have you felt unusual stress, anger or loneliness in  the last month? No   Who do you live with? Other   If you need help, do you have trouble finding someone available to you? No   Have you been bothered in the last four weeks by sexual problems? No   Do you have difficulty concentrating, remembering or making decisions? No       Fall Risk Screen:  NANNETTE Fall Risk Assessment was completed, and patient is at LOW risk for falls.Assessment completed on:10/3/2023    ACE III MINI        Does the patient have evidence of cognitive impairment? No    Aspirin use counseling: Taking ASA appropriately as indicated    Recent Lab Results:  CMP:  Lab Results   Component Value Date    BUN 17 10/03/2023    CREATININE 0.73 10/03/2023    EGFRIFNONA 74 09/14/2021    BCR 23.3 10/03/2023     10/03/2023    K 4.2 10/03/2023    CO2 25.7 10/03/2023    CALCIUM 10.1 10/03/2023    ALBUMIN 4.8 10/03/2023    BILITOT 0.5 10/03/2023    ALKPHOS 63 10/03/2023    AST 16 10/03/2023    ALT 9 10/03/2023     HbA1c:  Lab Results   Component Value Date    HGBA1C 7.60 (H) 10/03/2023    HGBA1C 7.3 03/15/2023     Microalbumin:  Lab Results   Component Value Date    MICROALBUR 1.5 10/03/2023     Lipid Panel  Lab Results   Component Value Date    CHOL 186 10/03/2023    TRIG 323 (H) 10/03/2023    HDL 50 10/03/2023    LDL 83 10/03/2023    AST 16 10/03/2023    ALT 9 10/03/2023       Visual Acuity:  No results found.    Age-appropriate Screening Schedule:  Refer to the list below for future screening recommendations based on patient's age, sex and/or medical conditions. Orders for these recommended tests are listed in the plan section. The patient has been provided with a written plan.    Health Maintenance   Topic Date Due    TDAP/TD VACCINES (2 - Td or Tdap) 03/25/2019    DIABETIC FOOT EXAM  09/14/2022    INFLUENZA VACCINE  08/01/2023    COVID-19 Vaccine (6 - 2023-24 season) 09/01/2023    ANNUAL WELLNESS VISIT  09/14/2023    BMI FOLLOWUP  09/14/2023    DIABETIC EYE EXAM  09/19/2023    HEMOGLOBIN A1C   04/03/2024    Pneumococcal Vaccine 65+ (3 - PPSV23 or PCV20) 09/23/2024    LIPID PANEL  10/03/2024    URINE MICROALBUMIN  10/03/2024    DXA SCAN  11/07/2024    MAMMOGRAM  09/05/2025    COLORECTAL CANCER SCREENING  08/31/2032    HEPATITIS C SCREENING  Completed    ZOSTER VACCINE  Completed    PAP SMEAR  Discontinued        Subjective   History of Present Illness    Rachel Dale is a 68 y.o. female who presents for a Subsequent Wellness Visit and for follow-up of diabetes, hypertension, hyperlipidemia, and osteoporosis.    Hypertension  The patient's blood pressure was 110 mmHg systolic today. She denies syncope, dyspnea, or angina.    Diabetes  The patient's lower extremities are in good condition. She denies paresthesia; however, she reports that she had cramps in her lower extremities on 10/02/2023.    Breast biopsy  She underwent a breast biopsy done and the results were normal. She has a follow-up appointment scheduled in 03/2024.    Exercise  The patient denies being active as much as she would like due to difficulty with the heat. She denies sitting all day, despite not currently working. She appreciates the colder weather and acknowledges a house with too much clutter.    Health maintenance  The patient reports her last ophthalmic examination was between 08/2022 and 09/2022, and she plans to schedule appointments for influenza and COVID-19 vaccinations. She inquires about the respiratory syncytial virus vaccine.    CHRONIC CONDITIONS    The following portions of the patient's history were reviewed and updated as appropriate: allergies, current medications, past family history, past medical history, past social history, past surgical history, and problem list.    Outpatient Medications Prior to Visit   Medication Sig Dispense Refill    Acetaminophen (TYLENOL ARTHRITIS PAIN PO) Take 2 tablets by mouth Daily.      aspirin 81 MG EC tablet Take 1 tablet by mouth Daily.      calcium carbonate (OS-ALTAGRACIA) 600 MG tablet  Take 1 tablet by mouth Daily.      cetirizine (zyrTEC) 10 MG tablet Take 1 tablet by mouth Daily.      dapagliflozin (Farxiga) 5 MG tablet tablet Take 1 tablet by mouth Daily. 90 tablet 3    glimepiride (AMARYL) 4 MG tablet TAKE 1 TABLET BY MOUTH TWICE A  tablet 3    lisinopril (PRINIVIL,ZESTRIL) 20 MG tablet TAKE 1 TABLET BY MOUTH EVERY DAY 90 tablet 3    Lutein-Zeaxanthin 25-5 MG capsule Take 1 capsule by mouth Daily.      metFORMIN (GLUCOPHAGE) 1000 MG tablet TAKE 1 TABLET BY MOUTH TWICE A DAY WITH MEALS 180 tablet 3    Omega-3 Fatty Acids (FISH OIL ULTRA) 1400 MG capsule Take 1 capsule by mouth Daily.      omeprazole (priLOSEC) 20 MG capsule Take 1 capsule by mouth Every Other Day.      simvastatin (ZOCOR) 20 MG tablet Take 1 tablet by mouth Every Evening. 90 tablet 3    Vitamin D, Ergocalciferol, 2000 units capsule Take 1 capsule by mouth Daily.      vitamin E 400 UNIT capsule Take 1 capsule by mouth Daily.       No facility-administered medications prior to visit.       Patient Active Problem List   Diagnosis    Allergic rhinitis    Diabetes mellitus, type 2    Peripheral neuropathy    Idiopathic peripheral neuropathy    Mixed hyperlipidemia    Obesity    Osteoporosis    Vitamin D deficiency    Essential hypertension       Advance Care Planning:  ACP discussion was held with the patient during this visit. Patient has an advance directive (not in EMR), copy requested.    Identification of Risk Factors:  Risk factors include: Advance Directive Discussion.    Review of Systems   Constitutional:  Negative for chills, fatigue and fever.   HENT:  Negative for congestion, ear pain and sinus pressure.    Respiratory:  Negative for cough, chest tightness, shortness of breath and wheezing.    Cardiovascular:  Negative for chest pain and palpitations.   Gastrointestinal:  Negative for abdominal pain, blood in stool and constipation.   Skin:  Negative for color change.   Allergic/Immunologic: Negative for  environmental allergies.   Neurological:  Negative for dizziness, speech difficulty and headaches.   Psychiatric/Behavioral:  Negative for confusion. The patient is not nervous/anxious.      Compared to one year ago, the patient feels her physical health is the same.  Compared to one year ago, the patient feels her mental health is the same.    Objective     Physical Exam  Vitals and nursing note reviewed.   Constitutional:       General: She is not in acute distress.     Appearance: She is well-developed. She is not diaphoretic.      Comments: NAD. A&Ox3.   HENT:      Head: Normocephalic and atraumatic.      Right Ear: External ear normal.      Left Ear: External ear normal.      Mouth/Throat:      Pharynx: No oropharyngeal exudate.   Eyes:      General: No scleral icterus.     Conjunctiva/sclera: Conjunctivae normal.      Pupils: Pupils are equal, round, and reactive to light.   Neck:      Thyroid: No thyromegaly.      Vascular: No carotid bruit.   Cardiovascular:      Rate and Rhythm: Normal rate and regular rhythm.      Pulses:           Dorsalis pedis pulses are 1+ on the right side and 1+ on the left side.      Heart sounds: Normal heart sounds, S1 normal and S2 normal. No murmur heard.    No friction rub. No gallop. No S3 or S4 sounds.      Comments: No thrills. Pulses 2+/4 B/L in UE and LE.   Pulmonary:      Effort: Pulmonary effort is normal. No respiratory distress.      Breath sounds: Normal breath sounds. No wheezing, rhonchi or rales.   Chest:      Chest wall: No tenderness.   Abdominal:      General: Bowel sounds are normal. There is no distension or abdominal bruit.      Palpations: Abdomen is soft. There is no mass.      Tenderness: There is no abdominal tenderness.      Hernia: No hernia is present.      Comments: Nondistended, nontender. (+)BSx4.    Musculoskeletal:         General: No deformity. Normal range of motion.      Cervical back: Full passive range of motion without pain and neck supple.  "     Right foot: No deformity.      Left foot: No deformity.      Comments: FROMx4, equal B/L.    Feet:      Right foot:      Protective Sensation: 5 sites tested.  5 sites sensed.      Skin integrity: Skin integrity normal.      Left foot:      Protective Sensation: 5 sites tested.  5 sites sensed.      Skin integrity: Skin integrity normal.      Comments: Sensation in both feet intact to monofilament.     Diabetic Foot Exam Performed and Monofilament Test Performed    Lymphadenopathy:      Cervical: No cervical adenopathy.   Skin:     General: Skin is warm and dry.      Findings: No erythema or rash.   Neurological:      Mental Status: She is alert and oriented to person, place, and time.      Cranial Nerves: No cranial nerve deficit.      Sensory: No sensory deficit.      Motor: No tremor, atrophy or abnormal muscle tone.      Coordination: Coordination normal.      Gait: Gait normal.      Deep Tendon Reflexes: Reflexes are normal and symmetric.   Psychiatric:         Behavior: Behavior normal.         Thought Content: Thought content normal. Thought content does not include homicidal or suicidal ideation. Thought content does not include homicidal or suicidal plan.         Judgment: Judgment normal.        Procedures     Vitals:    10/03/23 0807   BP: 110/64   BP Location: Left arm   Patient Position: Sitting   Cuff Size: Adult   Pulse: 76   Temp: 98.2 °F (36.8 °C)   Weight: 67.2 kg (148 lb 3.2 oz)   Height: 152.8 cm (60.16\")   PainSc:   5   PainLoc: Shoulder  Comment: right              Assessment & Plan   Problem List Items Addressed This Visit      1. Prevention  - Overall, she remains in good health with lifestyle habits, but she is encouraged to be more diligent with regular physical exercise. She is up to date on DEXA mammography. Pap smears are no longer indicated at age 68. We discussed the timing of influenza, COVID-19, and RSV exams later this fall.    2. Diabetes  - Hemoglobin A1c is pending. Last " hemoglobin A1c was controlled at 7.3 percent. Continue efforts at healthy diet as well as Farxiga, glimepiride, and metformin. Foot exam was performed today. She is due for eye exam.    3. Hypertension  - Blood pressure is relatively low today with a systolic of 110 mmHg. She has no symptoms of hypotension. She was asked to monitor her blood pressure, but she will continue lisinopril for now.    4. Hyperlipidemia  - Lipid panel is pending. Continue simvastatin and healthy diet.    5. Vitamin D deficiency  - Vitamin D level is pending. Continue vitamin D.    6. History of osteoporosis  - Last year's DEXA showed osteopenia. Continue calcium and vitamin D.    Follow up in 6 months.  Patient Self-Management and Personalized Health Advice  The patient has been provided with information about: diet, exercise, and weight management and preventive services including:   Annual Wellness Visit (AWV).    Outpatient Encounter Medications as of 10/3/2023   Medication Sig Dispense Refill    Acetaminophen (TYLENOL ARTHRITIS PAIN PO) Take 2 tablets by mouth Daily.      aspirin 81 MG EC tablet Take 1 tablet by mouth Daily.      calcium carbonate (OS-ALTAGRACIA) 600 MG tablet Take 1 tablet by mouth Daily.      cetirizine (zyrTEC) 10 MG tablet Take 1 tablet by mouth Daily.      dapagliflozin (Farxiga) 5 MG tablet tablet Take 1 tablet by mouth Daily. 90 tablet 3    glimepiride (AMARYL) 4 MG tablet TAKE 1 TABLET BY MOUTH TWICE A  tablet 3    lisinopril (PRINIVIL,ZESTRIL) 20 MG tablet TAKE 1 TABLET BY MOUTH EVERY DAY 90 tablet 3    Lutein-Zeaxanthin 25-5 MG capsule Take 1 capsule by mouth Daily.      metFORMIN (GLUCOPHAGE) 1000 MG tablet TAKE 1 TABLET BY MOUTH TWICE A DAY WITH MEALS 180 tablet 3    Omega-3 Fatty Acids (FISH OIL ULTRA) 1400 MG capsule Take 1 capsule by mouth Daily.      omeprazole (priLOSEC) 20 MG capsule Take 1 capsule by mouth Every Other Day.      simvastatin (ZOCOR) 20 MG tablet Take 1 tablet by mouth Every Evening. 90  tablet 3    Vitamin D, Ergocalciferol, 2000 units capsule Take 1 capsule by mouth Daily.      vitamin E 400 UNIT capsule Take 1 capsule by mouth Daily.       No facility-administered encounter medications on file as of 10/3/2023.       Reviewed use of high risk medication in the elderly: yes  Reviewed for potential of harmful drug interactions in the elderly: yes    Follow Up:  Return in about 6 months (around 4/3/2024) for follow up.     There are no Patient Instructions on file for this visit.    An After Visit Summary and PPPS with all of these plans were given to the patient.       Transcribed from ambient dictation for Jer Richardson MD by Braeden Sorto.  10/03/23   10:33 EDT    Patient or patient representative verbalized consent to the visit recording.  I have personally performed the services described in this document as transcribed by the above individual, and it is both accurate and complete.

## 2023-10-06 LAB — APO B SERPL-MCNC: 106 MG/DL

## 2023-10-08 RX ORDER — ROSUVASTATIN CALCIUM 10 MG/1
10 TABLET, COATED ORAL DAILY
Qty: 90 TABLET | Refills: 3 | Status: SHIPPED | OUTPATIENT
Start: 2023-10-08

## 2023-10-08 RX ORDER — DAPAGLIFLOZIN 10 MG/1
10 TABLET, FILM COATED ORAL DAILY
Qty: 90 TABLET | Refills: 3 | Status: SHIPPED | OUTPATIENT
Start: 2023-10-08

## 2023-12-11 RX ORDER — DAPAGLIFLOZIN 5 MG/1
5 TABLET, FILM COATED ORAL DAILY
Qty: 90 TABLET | Refills: 3 | OUTPATIENT
Start: 2023-12-11

## 2024-04-03 RX ORDER — LISINOPRIL 20 MG/1
TABLET ORAL
Qty: 90 TABLET | Refills: 3 | Status: SHIPPED | OUTPATIENT
Start: 2024-04-03

## 2024-04-03 RX ORDER — GLIMEPIRIDE 4 MG/1
TABLET ORAL
Qty: 180 TABLET | Refills: 3 | Status: SHIPPED | OUTPATIENT
Start: 2024-04-03

## 2024-04-03 NOTE — TELEPHONE ENCOUNTER
Rx Refill Note  Requested Prescriptions     Pending Prescriptions Disp Refills    metFORMIN (GLUCOPHAGE) 1000 MG tablet [Pharmacy Med Name: METFORMIN HCL 1,000 MG TABLET] 180 tablet 3     Sig: TAKE 1 TABLET BY MOUTH TWICE A DAY WITH MEALS    lisinopril (PRINIVIL,ZESTRIL) 20 MG tablet [Pharmacy Med Name: LISINOPRIL 20 MG TABLET] 90 tablet 3     Sig: TAKE 1 TABLET BY MOUTH EVERY DAY    glimepiride (AMARYL) 4 MG tablet [Pharmacy Med Name: GLIMEPIRIDE 4 MG TABLET] 180 tablet 3     Sig: TAKE 1 TABLET BY MOUTH TWICE A DAY      Last office visit with prescribing clinician: 10/3/2023   Last telemedicine visit with prescribing clinician: Visit date not found   Next office visit with prescribing clinician: 4/4/2024                         Would you like a call back once the refill request has been completed: [] Yes [] No    If the office needs to give you a call back, can they leave a voicemail: [] Yes [] No    Fadia Ratliff MA  04/03/24, 12:31 EDT

## 2024-04-04 ENCOUNTER — LAB (OUTPATIENT)
Dept: LAB | Facility: HOSPITAL | Age: 69
End: 2024-04-04
Payer: MEDICARE

## 2024-04-04 ENCOUNTER — OFFICE VISIT (OUTPATIENT)
Dept: INTERNAL MEDICINE | Facility: CLINIC | Age: 69
End: 2024-04-04
Payer: MEDICARE

## 2024-04-04 VITALS
HEART RATE: 76 BPM | DIASTOLIC BLOOD PRESSURE: 60 MMHG | HEIGHT: 60 IN | TEMPERATURE: 97.8 F | SYSTOLIC BLOOD PRESSURE: 108 MMHG | BODY MASS INDEX: 28.86 KG/M2 | WEIGHT: 147 LBS

## 2024-04-04 DIAGNOSIS — E78.2 MIXED HYPERLIPIDEMIA: ICD-10-CM

## 2024-04-04 DIAGNOSIS — I10 ESSENTIAL HYPERTENSION: ICD-10-CM

## 2024-04-04 DIAGNOSIS — E11.42 TYPE 2 DIABETES MELLITUS WITH DIABETIC POLYNEUROPATHY, WITHOUT LONG-TERM CURRENT USE OF INSULIN: Primary | ICD-10-CM

## 2024-04-04 LAB
ALBUMIN SERPL-MCNC: 4.6 G/DL (ref 3.5–5.2)
ALBUMIN/GLOB SERPL: 1.8 G/DL
ALP SERPL-CCNC: 65 U/L (ref 39–117)
ALT SERPL W P-5'-P-CCNC: 12 U/L (ref 1–33)
ANION GAP SERPL CALCULATED.3IONS-SCNC: 15.9 MMOL/L (ref 5–15)
AST SERPL-CCNC: 16 U/L (ref 1–32)
BILIRUB SERPL-MCNC: 0.4 MG/DL (ref 0–1.2)
BUN SERPL-MCNC: 17 MG/DL (ref 8–23)
BUN/CREAT SERPL: 23 (ref 7–25)
CALCIUM SPEC-SCNC: 10.8 MG/DL (ref 8.6–10.5)
CHLORIDE SERPL-SCNC: 99 MMOL/L (ref 98–107)
CHOLEST SERPL-MCNC: 161 MG/DL (ref 0–200)
CO2 SERPL-SCNC: 25.1 MMOL/L (ref 22–29)
CREAT SERPL-MCNC: 0.74 MG/DL (ref 0.57–1)
EGFRCR SERPLBLD CKD-EPI 2021: 89.4 ML/MIN/1.73
EXPIRATION DATE: ABNORMAL
GLOBULIN UR ELPH-MCNC: 2.6 GM/DL
GLUCOSE SERPL-MCNC: 161 MG/DL (ref 65–99)
HBA1C MFR BLD: 8.1 % (ref 4.5–5.7)
HDLC SERPL-MCNC: 44 MG/DL (ref 40–60)
LDLC SERPL CALC-MCNC: 69 MG/DL (ref 0–100)
LDLC/HDLC SERPL: 1.3 {RATIO}
Lab: ABNORMAL
POTASSIUM SERPL-SCNC: 4.6 MMOL/L (ref 3.5–5.2)
PROT SERPL-MCNC: 7.2 G/DL (ref 6–8.5)
SODIUM SERPL-SCNC: 140 MMOL/L (ref 136–145)
TRIGL SERPL-MCNC: 300 MG/DL (ref 0–150)
VLDLC SERPL-MCNC: 48 MG/DL (ref 5–40)

## 2024-04-04 PROCEDURE — 80061 LIPID PANEL: CPT

## 2024-04-04 PROCEDURE — 36415 COLL VENOUS BLD VENIPUNCTURE: CPT

## 2024-04-04 PROCEDURE — 80053 COMPREHEN METABOLIC PANEL: CPT

## 2024-04-04 PROCEDURE — 82172 ASSAY OF APOLIPOPROTEIN: CPT

## 2024-04-04 NOTE — PROGRESS NOTES
[facility]     Rachel Dale  1955   [id number]      Patient Care Team:  Jer Richardson MD as PCP - General (Internal Medicine)    Chief Complaint::   Chief Complaint   Patient presents with    Diabetes    Hyperlipidemia    Hypertension        HPI  The patient is a 68-year-old female who comes in for follow-up of diabetes, hypertension, and hyperlipidemia.    The patient reports a general sense of well-being, albeit with a noted decrease in her physical activity levels. She acknowledges having intentions to engage in physical exercise, however, she acknowledges a lack of motivation to do so.    The patient denies experiencing any chest discomfort, with the exception of mild dyspnea upon bending over or squeezing objects. She communicated with her pharmacy yesterday and was informed that her prescriptions would be communicated to us. She intends to travel to [place], [place], on Sundays.    Chronic Conditions:  Diabetes, hypertension, and hyperlipidemia    Patient Active Problem List   Diagnosis    Allergic rhinitis    Diabetes mellitus, type 2    Peripheral neuropathy    Idiopathic peripheral neuropathy    Mixed hyperlipidemia    Obesity    Osteoporosis    Vitamin D deficiency    Essential hypertension        Past Medical History:   Diagnosis Date    Diabetes mellitus     Hypertension     Obesity        Past Surgical History:   Procedure Laterality Date    BREAST SURGERY      BREAST REDUCTION     CHOLECYSTECTOMY  2009    COLONOSCOPY  2022    EYE SURGERY  2015    Cataract       Family History   Problem Relation Age of Onset    Hypertension Mother     Stroke Father     Colon cancer Brother 74    Breast cancer Maternal Aunt     Coronary artery disease Maternal Grandfather        Social History     Socioeconomic History    Marital status:    Tobacco Use    Smoking status: Never    Smokeless tobacco: Never   Vaping Use    Vaping status: Never Used   Substance and Sexual Activity     Alcohol use: Not Currently     Comment: yearly    Drug use: Never    Sexual activity: Not Currently     Partners: Male       Allergies   Allergen Reactions    Sulfa Antibiotics Other (See Comments)     OTHER         Current Outpatient Medications:     Acetaminophen (TYLENOL ARTHRITIS PAIN PO), Take 2 tablets by mouth 2 (Two) Times a Day As Needed., Disp: , Rfl:     aspirin 81 MG EC tablet, Take 1 tablet by mouth Daily., Disp: , Rfl:     calcium carbonate (OS-ALTAGRACIA) 600 MG tablet, Take 1 tablet by mouth Daily., Disp: , Rfl:     cetirizine (zyrTEC) 10 MG tablet, Take 1 tablet by mouth Daily., Disp: , Rfl:     dapagliflozin Propanediol (Farxiga) 10 MG tablet, Take 10 mg by mouth Daily., Disp: 90 tablet, Rfl: 3    glimepiride (AMARYL) 4 MG tablet, TAKE 1 TABLET BY MOUTH TWICE A DAY, Disp: 180 tablet, Rfl: 3    lisinopril (PRINIVIL,ZESTRIL) 20 MG tablet, TAKE 1 TABLET BY MOUTH EVERY DAY, Disp: 90 tablet, Rfl: 3    Lutein-Zeaxanthin 25-5 MG capsule, Take 1 capsule by mouth Daily., Disp: , Rfl:     metFORMIN (GLUCOPHAGE) 1000 MG tablet, TAKE 1 TABLET BY MOUTH TWICE A DAY WITH MEALS, Disp: 180 tablet, Rfl: 3    Omega-3 Fatty Acids (FISH OIL ULTRA) 1400 MG capsule, Take 1 capsule by mouth Daily., Disp: , Rfl:     omeprazole (priLOSEC) 20 MG capsule, Take 1 capsule by mouth Every Other Day., Disp: , Rfl:     rosuvastatin (Crestor) 10 MG tablet, Take 1 tablet by mouth Daily., Disp: 90 tablet, Rfl: 3    Vitamin D, Ergocalciferol, 2000 units capsule, Take 1 capsule by mouth Daily., Disp: , Rfl:     vitamin E 400 UNIT capsule, Take 1 capsule by mouth Daily., Disp: , Rfl:     Review of Systems   Constitutional:  Negative for chills, fatigue and fever.   HENT:  Negative for congestion, ear pain and sinus pressure.    Respiratory:  Negative for cough, chest tightness, shortness of breath and wheezing.    Cardiovascular:  Negative for chest pain and palpitations.   Gastrointestinal:  Negative for abdominal pain, blood in stool and  "constipation.   Skin:  Negative for color change.   Allergic/Immunologic: Negative for environmental allergies.   Neurological:  Negative for dizziness, speech difficulty and headache.   Psychiatric/Behavioral:  Negative for decreased concentration. The patient is not nervous/anxious.         Vital Signs  Vitals:    04/04/24 0805   BP: 108/60   BP Location: Left arm   Patient Position: Sitting   Cuff Size: Adult   Pulse: 76   Temp: 97.8 °F (36.6 °C)   TempSrc: Temporal   Weight: 66.7 kg (147 lb)   Height: 152.8 cm (60.16\")   PainSc: 0-No pain       Physical Exam  Constitutional:       Appearance: Normal appearance.   HENT:      Right Ear: Tympanic membrane and ear canal normal.      Left Ear: Tympanic membrane and ear canal normal.      Mouth/Throat:      Pharynx: Oropharynx is clear. No posterior oropharyngeal erythema.   Eyes:      Extraocular Movements: Extraocular movements intact.      Pupils: Pupils are equal, round, and reactive to light.   Neck:      Vascular: No carotid bruit.   Cardiovascular:      Rate and Rhythm: Normal rate and regular rhythm.      Pulses:           Dorsalis pedis pulses are 1+ on the right side and 1+ on the left side.   Pulmonary:      Breath sounds: Normal breath sounds.   Abdominal:      General: Bowel sounds are normal.      Palpations: Abdomen is soft. There is no mass.      Tenderness: There is no abdominal tenderness.   Musculoskeletal:      Cervical back: Normal range of motion and neck supple.      Right lower leg: No edema.      Left lower leg: No edema.      Right foot: No deformity.      Left foot: No deformity.   Feet:      Right foot:      Protective Sensation: 5 sites tested.  5 sites sensed.      Skin integrity: Skin integrity normal.      Left foot:      Protective Sensation: 5 sites tested.  5 sites sensed.      Skin integrity: Skin integrity normal.      Comments: Sensation in both feet intact to monofilament.     Diabetic Foot Exam Performed and Monofilament Test " Performed    Skin:     General: Skin is warm and dry.   Neurological:      Mental Status: She is alert and oriented to person, place, and time.      Cranial Nerves: No cranial nerve deficit.      Motor: No weakness.      Gait: Gait normal.   Psychiatric:         Mood and Affect: Mood normal.         Behavior: Behavior normal.          Procedures    ACE III MINI             Assessment/Plan:    Diagnoses and all orders for this visit:    1. Type 2 diabetes mellitus with diabetic polyneuropathy, without long-term current use of insulin (Primary)  -     POC Glycosylated Hemoglobin (Hb A1C)    2. Essential hypertension    3. Mixed hyperlipidemia  -     Apolipoprotein B; Future  -     Comprehensive Metabolic Panel; Future  -     Lipid Panel; Future      1. Diabetes Mellitus.  The patient's A1c level is currently at 8.1, despite an increase in the dosage of Farxiga. A comprehensive discussion regarding potential treatment options was conducted, encompassing a more strict dietary regimen over the ensuing months or the introduction of GLP-1 agonist. The patient expressed a preference for a 6-month trial of improved diet and weight loss. However, if her A1c level does not fall below 7.5 in the fall, the addition of Ozempic or Mounjaro will be considered.    2. Hypertension.  The patient's blood pressure is well-controlled on lisinopril.    3. Hyperlipidemia.  A lipid panel results are pending. The patient's medication was switched to rosuvastatin during the last visit due to an elevated apolipoprotein B.    Follow-up  The patient is scheduled for a follow-up visit in 6 months.    Plan of care reviewed with patient at the conclusion of today's visit. Education was provided regarding diagnosis, management, and any prescribed or recommended OTC medications.Patient verbalizes understanding of and agreement with management plan.       Transcribed from ambient dictation for Jer Richardson MD by Sally FUENTES  04/04/24    11:59 EDT    Patient or patient representative verbalized consent to the visit recording.  I have personally performed the services described in this document as transcribed by the above individual, and it is both accurate and complete.

## 2024-04-06 LAB — APO B SERPL-MCNC: 87 MG/DL

## 2024-08-05 ENCOUNTER — TELEPHONE (OUTPATIENT)
Dept: INTERNAL MEDICINE | Facility: CLINIC | Age: 69
End: 2024-08-05
Payer: MEDICARE

## 2024-08-05 DIAGNOSIS — I10 ESSENTIAL HYPERTENSION: ICD-10-CM

## 2024-08-05 DIAGNOSIS — E78.2 MIXED HYPERLIPIDEMIA: ICD-10-CM

## 2024-08-05 DIAGNOSIS — E11.42 TYPE 2 DIABETES MELLITUS WITH DIABETIC POLYNEUROPATHY, WITHOUT LONG-TERM CURRENT USE OF INSULIN: Primary | ICD-10-CM

## 2024-08-05 DIAGNOSIS — E55.9 VITAMIN D DEFICIENCY: ICD-10-CM

## 2024-09-03 ENCOUNTER — HOSPITAL ENCOUNTER (EMERGENCY)
Facility: HOSPITAL | Age: 69
Discharge: HOME | End: 2024-09-03
Attending: EMERGENCY MEDICINE
Payer: MEDICARE

## 2024-09-03 VITALS
DIASTOLIC BLOOD PRESSURE: 78 MMHG | BODY MASS INDEX: 29.45 KG/M2 | HEIGHT: 60 IN | OXYGEN SATURATION: 98 % | HEART RATE: 82 BPM | RESPIRATION RATE: 18 BRPM | WEIGHT: 150 LBS | TEMPERATURE: 97.8 F | SYSTOLIC BLOOD PRESSURE: 136 MMHG

## 2024-09-03 DIAGNOSIS — L03.114 CELLULITIS OF LEFT HAND: ICD-10-CM
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
## 2024-09-03 DIAGNOSIS — S50.812A CAT SCRATCH OF LEFT FOREARM, INITIAL ENCOUNTER: Primary | ICD-10-CM

## 2024-09-03 PROCEDURE — 99283 EMERGENCY DEPT VISIT LOW MDM: CPT

## 2024-09-03 RX ORDER — DOXYCYCLINE 100 MG/1
100 CAPSULE ORAL ONCE
Status: COMPLETED | OUTPATIENT
Start: 2024-09-03 | End: 2024-09-03

## 2024-09-03 RX ORDER — DOXYCYCLINE 100 MG/1
100 CAPSULE ORAL 2 TIMES DAILY
Qty: 20 CAPSULE | Refills: 0 | Status: SHIPPED | OUTPATIENT
Start: 2024-09-03

## 2024-09-03 RX ADMIN — DOXYCYCLINE 100 MG: 100 CAPSULE ORAL at 12:39

## 2024-09-03 NOTE — ED PROVIDER NOTES
Subjective   History of Present Illness  69-year-old female presents emergency department today with a abrasion to the left dorsal hand.  She reports her roommates cat scratched her she states that she is not certain it was not a bite.  States that this happened about 5 days ago and then she began getting some redness of the dorsal aspect of the hand yesterday.  She has no red streaks.  She had no swollen lymph nodes.  She denies any fevers or chills or other symptoms.  She does have a history of an allergy to sulfa antibiotics.    History provided by:  Patient   used: No    Wound Infection  Location:  Cat scratch dorsal aspect left hand.  Severity:  Moderate  Onset quality:  Gradual  Duration:  2 days  Timing:  Constant  Progression:  Worsening  Chronicity:  New  Context:  Scratched by roommates cat.  Relieved by:  Elevation  Associated symptoms: no abdominal pain, no chest pain, no congestion, no cough, no diarrhea, no ear pain, no fever, no loss of consciousness, no myalgias, no nausea, no rhinorrhea, no sore throat, no vomiting and no wheezing        Review of Systems   Constitutional:  Negative for fever.   HENT:  Negative for congestion, ear pain, rhinorrhea and sore throat.    Respiratory:  Negative for cough, chest tightness and wheezing.    Cardiovascular:  Negative for chest pain and palpitations.   Gastrointestinal:  Negative for abdominal pain, diarrhea, nausea and vomiting.   Musculoskeletal:  Negative for myalgias.   Neurological:  Negative for loss of consciousness.       Past Medical History:   Diagnosis Date   • Diabetes mellitus    • Hypertension    • Obesity        Allergies   Allergen Reactions   • Sulfa Antibiotics Other (See Comments)     OTHER       Past Surgical History:   Procedure Laterality Date   • BREAST SURGERY      BREAST REDUCTION    • CHOLECYSTECTOMY  2009   • COLONOSCOPY  2022   • EYE SURGERY  2015    Cataract       Family History   Problem Relation Age of Onset    • Hypertension Mother    • Stroke Father    • Colon cancer Brother 74   • Breast cancer Maternal Aunt    • Coronary artery disease Maternal Grandfather        Social History     Socioeconomic History   • Marital status:    Tobacco Use   • Smoking status: Never   • Smokeless tobacco: Never   Vaping Use   • Vaping status: Never Used   Substance and Sexual Activity   • Alcohol use: Not Currently     Comment: yearly   • Drug use: Never   • Sexual activity: Not Currently     Partners: Male           Objective   Physical Exam  Vitals and nursing note reviewed.   Constitutional:       General: She is not in acute distress.     Appearance: She is well-developed. She is not diaphoretic.   HENT:      Head: Normocephalic and atraumatic.      Nose: Nose normal.   Eyes:      General: No scleral icterus.     Conjunctiva/sclera: Conjunctivae normal.   Pulmonary:      Effort: Pulmonary effort is normal. No respiratory distress.   Musculoskeletal:         General: Normal range of motion.      Cervical back: Normal range of motion and neck supple.   Skin:     General: Skin is warm and dry.      Comments: Dorsal aspect of the left hand has 2 small abrasions over the dorsal third finger.  These are not actively bleeding.  Full range of motion of all fingers.  No tenderness over the joints individually.  Cap refill less than 2 seconds sensations intact.  Dorsal aspect of the hand has redness increased warmth to touch there is no abscess.  No regional lymphadenitis or lymphadenopathy no lymphangitic streaks.  Radial and ulnar pulses are strong and equal sensations intact.   Neurological:      Mental Status: She is alert and oriented to person, place, and time.   Psychiatric:         Behavior: Behavior normal.       Procedures           ED Course  ED Course as of 09/03/24 1233   Tue Sep 03, 2024   1232 Discussed with our Pharm.DEmre Park, he suggest doxycycline since there is no bite and no regional lymphadenitis or  lymphadenopathy which indicate a to see the Pasteurella infection. [MARIS]      ED Course User Index  [MARIS] Dayo Rivera PA                                             Medical Decision Making  Problems Addressed:  Cat scratch of left forearm, initial encounter: complicated acute illness or injury  Cellulitis of left hand: complicated acute illness or injury    Risk  Prescription drug management.        Final diagnoses:   Cat scratch of left forearm, initial encounter   Cellulitis of left hand       ED Disposition  ED Disposition       ED Disposition   Discharge    Condition   Stable    Comment   --               Louise Oconnor MD  [address] [place] [address] [address]  [place] [address]  [phone]               Medication List        New Prescriptions      doxycycline 100 MG capsule  Commonly known as: MONODOX  Take 1 capsule by mouth 2 (Two) Times a Day.               Where to Get Your Medications        These medications were sent to [facility]/pharmacy #[id number] - [place], [place] - [address] [facility] [phone] [facility] [phone]   [address] [place] [address]      Phone: [phone]   doxycycline 100 MG capsule            Dayo Rivera PA  09/04/24 2024

## 2024-09-10 ENCOUNTER — TELEPHONE (OUTPATIENT)
Dept: INTERNAL MEDICINE | Facility: CLINIC | Age: 69
End: 2024-09-10
Payer: MEDICARE

## 2024-09-10 DIAGNOSIS — R92.2 INCONCLUSIVE MAMMOGRAM: ICD-10-CM

## 2024-09-10 DIAGNOSIS — N63.10 MASS OF RIGHT BREAST, UNSPECIFIED QUADRANT: Primary | ICD-10-CM

## 2024-09-10 NOTE — TELEPHONE ENCOUNTER
SUBHA RAYGOZA THE NURSE NAVIGATOR FROM [facility] CALLED TO LET DR FIELD KNOW THAT DR AIDAN STONER IS NEEDING A ORDER FOR OUR PATIENT TO GET A BILATERAL BREAST MRI WITH & WITHOUT CONTRAST. SHE SAID TO LET YOU KNOW THAT OUR OFFICE ALSO NEEDS TO DO THE PRECERT.    DR STONER WOULD LIKE FOR THE PATIENT TO GET THIS DONE ANYTIME BETWEEN SEPT 30TH THRU OCT 4TH. IF YOU HAVE ANY QUESTION FEEL FREE TO CALL [phone] WITH QST FOR DR AIDAN STONER.

## 2024-09-11 NOTE — TELEPHONE ENCOUNTER
Indira called back and apologized for not giving us the correct information the first time. She said the correct DX is: R breast mass.

## 2024-09-13 DIAGNOSIS — R92.8 OTHER ABNORMAL AND INCONCLUSIVE FINDINGS ON DIAGNOSTIC IMAGING OF BREAST: ICD-10-CM

## 2024-09-13 DIAGNOSIS — N63.10 MASS OF RIGHT BREAST, UNSPECIFIED QUADRANT: Primary | ICD-10-CM

## 2024-09-25 RX ORDER — ROSUVASTATIN CALCIUM 10 MG/1
10 TABLET, COATED ORAL DAILY
Qty: 90 TABLET | Refills: 3 | Status: SHIPPED | OUTPATIENT
Start: 2024-09-25

## 2024-09-26 ENCOUNTER — TELEPHONE (OUTPATIENT)
Dept: INTERNAL MEDICINE | Facility: CLINIC | Age: 69
End: 2024-09-26
Payer: MEDICARE

## 2024-09-30 RX ORDER — DAPAGLIFLOZIN 10 MG/1
1 TABLET, FILM COATED ORAL DAILY
Qty: 90 TABLET | Refills: 3 | Status: SHIPPED | OUTPATIENT
Start: 2024-09-30

## 2024-11-26 ENCOUNTER — LAB (OUTPATIENT)
Dept: LAB | Facility: HOSPITAL | Age: 69
End: 2024-11-26
Payer: MEDICARE

## 2024-11-26 DIAGNOSIS — E55.9 VITAMIN D DEFICIENCY: ICD-10-CM

## 2024-11-26 DIAGNOSIS — I10 ESSENTIAL HYPERTENSION: ICD-10-CM

## 2024-11-26 DIAGNOSIS — E78.2 MIXED HYPERLIPIDEMIA: ICD-10-CM

## 2024-11-26 DIAGNOSIS — E11.42 TYPE 2 DIABETES MELLITUS WITH DIABETIC POLYNEUROPATHY, WITHOUT LONG-TERM CURRENT USE OF INSULIN: ICD-10-CM

## 2024-11-26 LAB
25(OH)D3 SERPL-MCNC: 82.1 NG/ML (ref 30–100)
ALBUMIN SERPL-MCNC: 5.1 G/DL (ref 3.5–5.2)
ALBUMIN UR-MCNC: 2.2 MG/DL
ALBUMIN/GLOB SERPL: 1.6 G/DL
ALP SERPL-CCNC: 65 U/L (ref 39–117)
ALT SERPL W P-5'-P-CCNC: 13 U/L (ref 1–33)
ANION GAP SERPL CALCULATED.3IONS-SCNC: 16 MMOL/L (ref 5–15)
AST SERPL-CCNC: 16 U/L (ref 1–32)
BASOPHILS # BLD AUTO: 0.05 10*3/MM3 (ref 0–0.2)
BASOPHILS NFR BLD AUTO: 0.6 % (ref 0–1.5)
BILIRUB SERPL-MCNC: 0.4 MG/DL (ref 0–1.2)
BUN SERPL-MCNC: 17 MG/DL (ref 8–23)
BUN/CREAT SERPL: 23.6 (ref 7–25)
CALCIUM SPEC-SCNC: 10.1 MG/DL (ref 8.6–10.5)
CHLORIDE SERPL-SCNC: 97 MMOL/L (ref 98–107)
CHOLEST SERPL-MCNC: 185 MG/DL (ref 0–200)
CO2 SERPL-SCNC: 26 MMOL/L (ref 22–29)
CREAT SERPL-MCNC: 0.72 MG/DL (ref 0.57–1)
CREAT UR-MCNC: 83.4 MG/DL
DEPRECATED RDW RBC AUTO: 41.8 FL (ref 37–54)
EGFRCR SERPLBLD CKD-EPI 2021: 91.8 ML/MIN/1.73
EOSINOPHIL # BLD AUTO: 0.14 10*3/MM3 (ref 0–0.4)
EOSINOPHIL NFR BLD AUTO: 1.7 % (ref 0.3–6.2)
ERYTHROCYTE [DISTWIDTH] IN BLOOD BY AUTOMATED COUNT: 13.5 % (ref 12.3–15.4)
GLOBULIN UR ELPH-MCNC: 3.1 GM/DL
GLUCOSE SERPL-MCNC: 165 MG/DL (ref 65–99)
HBA1C MFR BLD: 7.1 % (ref 4.8–5.6)
HCT VFR BLD AUTO: 43.2 % (ref 34–46.6)
HDLC SERPL-MCNC: 50 MG/DL (ref 40–60)
HGB BLD-MCNC: 14.2 G/DL (ref 12–15.9)
IMM GRANULOCYTES # BLD AUTO: 0.02 10*3/MM3 (ref 0–0.05)
IMM GRANULOCYTES NFR BLD AUTO: 0.2 % (ref 0–0.5)
LDLC SERPL CALC-MCNC: 79 MG/DL (ref 0–100)
LDLC/HDLC SERPL: 1.31 {RATIO}
LYMPHOCYTES # BLD AUTO: 4.11 10*3/MM3 (ref 0.7–3.1)
LYMPHOCYTES NFR BLD AUTO: 51.3 % (ref 19.6–45.3)
MCH RBC QN AUTO: 28.4 PG (ref 26.6–33)
MCHC RBC AUTO-ENTMCNC: 32.9 G/DL (ref 31.5–35.7)
MCV RBC AUTO: 86.4 FL (ref 79–97)
MICROALBUMIN/CREAT UR: 26.4 MG/G (ref 0–29)
MONOCYTES # BLD AUTO: 0.69 10*3/MM3 (ref 0.1–0.9)
MONOCYTES NFR BLD AUTO: 8.6 % (ref 5–12)
NEUTROPHILS NFR BLD AUTO: 3 10*3/MM3 (ref 1.7–7)
NEUTROPHILS NFR BLD AUTO: 37.6 % (ref 42.7–76)
NRBC BLD AUTO-RTO: 0 /100 WBC (ref 0–0.2)
PLATELET # BLD AUTO: 412 10*3/MM3 (ref 140–450)
PMV BLD AUTO: 9.1 FL (ref 6–12)
POTASSIUM SERPL-SCNC: 3.9 MMOL/L (ref 3.5–5.2)
PROT SERPL-MCNC: 8.2 G/DL (ref 6–8.5)
RBC # BLD AUTO: 5 10*6/MM3 (ref 3.77–5.28)
SODIUM SERPL-SCNC: 139 MMOL/L (ref 136–145)
TRIGL SERPL-MCNC: 347 MG/DL (ref 0–150)
VLDLC SERPL-MCNC: 56 MG/DL (ref 5–40)
WBC NRBC COR # BLD AUTO: 8.01 10*3/MM3 (ref 3.4–10.8)

## 2024-11-26 PROCEDURE — 82172 ASSAY OF APOLIPOPROTEIN: CPT

## 2024-11-26 PROCEDURE — 85025 COMPLETE CBC W/AUTO DIFF WBC: CPT

## 2024-11-26 PROCEDURE — 82570 ASSAY OF URINE CREATININE: CPT

## 2024-11-26 PROCEDURE — 80061 LIPID PANEL: CPT

## 2024-11-26 PROCEDURE — 82306 VITAMIN D 25 HYDROXY: CPT

## 2024-11-26 PROCEDURE — 82043 UR ALBUMIN QUANTITATIVE: CPT

## 2024-11-26 PROCEDURE — 83036 HEMOGLOBIN GLYCOSYLATED A1C: CPT

## 2024-11-26 PROCEDURE — 80053 COMPREHEN METABOLIC PANEL: CPT

## 2024-11-26 PROCEDURE — 36415 COLL VENOUS BLD VENIPUNCTURE: CPT

## 2024-11-28 LAB — APO B SERPL-MCNC: 95 MG/DL

## 2024-12-02 ENCOUNTER — OFFICE VISIT (OUTPATIENT)
Dept: INTERNAL MEDICINE | Facility: CLINIC | Age: 69
End: 2024-12-02
Payer: MEDICARE

## 2024-12-02 VITALS
HEART RATE: 74 BPM | DIASTOLIC BLOOD PRESSURE: 64 MMHG | TEMPERATURE: 98.6 F | SYSTOLIC BLOOD PRESSURE: 136 MMHG | HEIGHT: 60 IN | WEIGHT: 145.6 LBS | BODY MASS INDEX: 28.58 KG/M2

## 2024-12-02 DIAGNOSIS — M81.0 AGE-RELATED OSTEOPOROSIS WITHOUT CURRENT PATHOLOGICAL FRACTURE: ICD-10-CM

## 2024-12-02 DIAGNOSIS — E11.42 TYPE 2 DIABETES MELLITUS WITH DIABETIC POLYNEUROPATHY, WITHOUT LONG-TERM CURRENT USE OF INSULIN: ICD-10-CM

## 2024-12-02 DIAGNOSIS — Z00.00 PREVENTATIVE HEALTH CARE: Primary | ICD-10-CM

## 2024-12-02 DIAGNOSIS — I10 ESSENTIAL HYPERTENSION: ICD-10-CM

## 2024-12-02 DIAGNOSIS — E78.2 MIXED HYPERLIPIDEMIA: ICD-10-CM

## 2024-12-02 PROCEDURE — 99397 PER PM REEVAL EST PAT 65+ YR: CPT | Performed by: INTERNAL MEDICINE

## 2024-12-02 PROCEDURE — 3051F HG A1C>EQUAL 7.0%<8.0%: CPT | Performed by: INTERNAL MEDICINE

## 2024-12-02 PROCEDURE — 3075F SYST BP GE 130 - 139MM HG: CPT | Performed by: INTERNAL MEDICINE

## 2024-12-02 PROCEDURE — 1159F MED LIST DOCD IN RCRD: CPT | Performed by: INTERNAL MEDICINE

## 2024-12-02 PROCEDURE — 3078F DIAST BP <80 MM HG: CPT | Performed by: INTERNAL MEDICINE

## 2024-12-02 PROCEDURE — 1125F AMNT PAIN NOTED PAIN PRSNT: CPT | Performed by: INTERNAL MEDICINE

## 2024-12-02 PROCEDURE — G0439 PPPS, SUBSEQ VISIT: HCPCS | Performed by: INTERNAL MEDICINE

## 2024-12-02 PROCEDURE — 1160F RVW MEDS BY RX/DR IN RCRD: CPT | Performed by: INTERNAL MEDICINE

## 2024-12-02 NOTE — PROGRESS NOTES
Subjective   The ABCs of the Annual Wellness Visit  Medicare Wellness Visit      Rachel Dale is a 69 y.o. patient who presents for a Medicare Wellness Visit.    The following portions of the patient's history were reviewed and   updated as appropriate: allergies, current medications, past family history, past medical history, past social history, past surgical history, and problem list.    Compared to one year ago, the patient's physical   health is the same.  Compared to one year ago, the patient's mental   health is the same.    Recent Hospitalizations:  She was not admitted to the hospital during the last year.     Current Medical Providers:  Patient Care Team:  Jer Richardson MD as PCP - General (Internal Medicine)    Outpatient Medications Prior to Visit   Medication Sig Dispense Refill    Acetaminophen (TYLENOL ARTHRITIS PAIN PO) Take 2 tablets by mouth 2 (Two) Times a Day As Needed.      aspirin 81 MG EC tablet Take 1 tablet by mouth Daily.      calcium carbonate (OS-ALTAGRACIA) 600 MG tablet Take 1 tablet by mouth Daily.      cetirizine (zyrTEC) 10 MG tablet Take 1 tablet by mouth Daily.      Farxiga 10 MG tablet TAKE 1 TABLET BY MOUTH DAILY 90 tablet 3    glimepiride (AMARYL) 4 MG tablet TAKE 1 TABLET BY MOUTH TWICE A  tablet 3    lisinopril (PRINIVIL,ZESTRIL) 20 MG tablet TAKE 1 TABLET BY MOUTH EVERY DAY 90 tablet 3    Lutein-Zeaxanthin 25-5 MG capsule Take 1 capsule by mouth Daily.      metFORMIN (GLUCOPHAGE) 1000 MG tablet TAKE 1 TABLET BY MOUTH TWICE A DAY WITH MEALS 180 tablet 3    Omega-3 Fatty Acids (FISH OIL ULTRA) 1400 MG capsule Take 1 capsule by mouth Daily.      omeprazole (priLOSEC) 20 MG capsule Take 1 capsule by mouth Every Other Day.      rosuvastatin (CRESTOR) 10 MG tablet TAKE 1 TABLET BY MOUTH EVERY DAY 90 tablet 3    Vitamin D, Ergocalciferol, 2000 units capsule Take 1 capsule by mouth Daily.      vitamin E 400 UNIT capsule Take 1 capsule by mouth Daily.      doxycycline  "(MONODOX) 100 MG capsule Take 1 capsule by mouth 2 (Two) Times a Day. 20 capsule 0     No facility-administered medications prior to visit.     No opioid medication identified on active medication list. I have reviewed chart for other potential  high risk medication/s and harmful drug interactions in the elderly.      Aspirin is on active medication list. Aspirin use is indicated based on review of current medical condition/s. Pros and cons of this therapy have been discussed today. Benefits of this medication outweigh potential harm.  Patient has been encouraged to continue taking this medication.  .      Patient Active Problem List   Diagnosis    Allergic rhinitis    Diabetes mellitus, type 2    Peripheral neuropathy    Idiopathic peripheral neuropathy    Mixed hyperlipidemia    Obesity    Osteoporosis    Vitamin D deficiency    Essential hypertension     Advance Care Planning Advance Directive is not on file.  ACP discussion was held with the patient during this visit. Patient has an advance directive (not in EMR), copy requested.            Objective   Vitals:    12/02/24 1433   BP: 136/64   BP Location: Left arm   Patient Position: Sitting   Cuff Size: Adult   Pulse: 74   Temp: 98.6 °F (37 °C)   TempSrc: Infrared   Weight: 66 kg (145 lb 9.6 oz)   Height: 153 cm (60.24\")   PainSc:   2   PainLoc: Hip       Estimated body mass index is 28.21 kg/m² as calculated from the following:    Height as of this encounter: 153 cm (60.24\").    Weight as of this encounter: 66 kg (145 lb 9.6 oz).    BMI is >= 25 and <30. (Overweight) The following options were offered after discussion;: exercise counseling/recommendations and nutrition counseling/recommendations       Does the patient have evidence of cognitive impairment? No  Lab Results   Component Value Date    TRIG 347 (H) 11/26/2024    HDL 50 11/26/2024    LDL 79 11/26/2024    VLDL 56 (H) 11/26/2024    HGBA1C 7.10 (H) 11/26/2024                                              "                                                   Health  Risk Assessment    Smoking Status:  Social History     Tobacco Use   Smoking Status Never   Smokeless Tobacco Never     Alcohol Consumption:  Social History     Substance and Sexual Activity   Alcohol Use Not Currently    Comment: 1 glass of wine a year       Fall Risk Screen  GENESISADI Fall Risk Assessment was completed, and patient is at LOW risk for falls.Assessment completed on:2024    Depression Screening   Little interest or pleasure in doing things? Not at all   Feeling down, depressed, or hopeless? Not at all   PHQ-2 Total Score 0      Health Habits and Functional and Cognitive Screenin/25/2024     8:08 PM   Functional & Cognitive Status   Do you have difficulty preparing food and eating? No    Do you have difficulty bathing yourself, getting dressed or grooming yourself? No    Do you have difficulty using the toilet? No    Do you have difficulty moving around from place to place? No    Do you have trouble with steps or getting out of a bed or a chair? No    Current Diet Unhealthy Diet    Dental Exam Up to date    Eye Exam Not up to date    Exercise (times per week) 0 times per week    Current Exercises Include House Cleaning;Yard Work    Do you need help using the phone?  No    Are you deaf or do you have serious difficulty hearing?  No    Do you need help to go to places out of walking distance? No    Do you need help shopping? No    Do you need help preparing meals?  No    Do you need help with housework?  No    Do you need help with laundry? No    Do you need help taking your medications? No    Do you need help managing money? No    Do you ever drive or ride in a car without wearing a seat belt? No    Have you felt unusual stress, anger or loneliness in the last month? No    Who do you live with? Other    If you need help, do you have trouble finding someone available to you? No    Have you been bothered in the last four weeks by  sexual problems? No    Do you have difficulty concentrating, remembering or making decisions? No        Patient-reported           Age-appropriate Screening Schedule:  Refer to the list below for future screening recommendations based on patient's age, sex and/or medical conditions. Orders for these recommended tests are listed in the plan section. The patient has been provided with a written plan.    Health Maintenance List  Health Maintenance   Topic Date Due    TDAP/TD VACCINES (2 - Td or Tdap) 03/25/2019    BMI FOLLOWUP  09/14/2023    COVID-19 Vaccine (7 - 2024-25 season) 09/01/2024    Pneumococcal Vaccine 65+ (3 of 3 - PPSV23 or PCV20) 09/23/2024    ANNUAL WELLNESS VISIT  10/03/2024    DXA SCAN  11/07/2024    DIABETIC EYE EXAM  11/08/2024    HEMOGLOBIN A1C  05/26/2025    LIPID PANEL  11/26/2025    DIABETIC FOOT EXAM  12/02/2025    MAMMOGRAM  09/06/2026    COLORECTAL CANCER SCREENING  08/31/2032    HEPATITIS C SCREENING  Completed    INFLUENZA VACCINE  Completed    ZOSTER VACCINE  Completed    PAP SMEAR  Discontinued    URINE MICROALBUMIN  Discontinued                                                                                                                                                CMS Preventative Services Quick Reference  Risk Factors Identified During Encounter  None Identified    The above risks/problems have been discussed with the patient.  Pertinent information has been shared with the patient in the After Visit Summary.  An After Visit Summary and PPPS were made available to the patient.    Follow Up:   Next Medicare Wellness visit to be scheduled in 1 year.         Additional E&M Note during same encounter follows:  Patient has additional, significant, and separately identifiable condition(s)/problem(s) that require work above and beyond the Medicare Wellness Visit     Chief Complaint  Annual Exam, Hypertension, and Hyperlipidemia    Subjective    HPI  Rachel is also being seen today for an  "annual adult preventative physical exam.        The patient is a 69-year-old female who comes in for a subsequent Medicare annual wellness examination and for follow-up of diabetes, hyperlipidemia, hypertension, and osteoporosis.    She reports overall good health. She does not experience shortness of breath or chest pain.    In September 2024, she experienced a cat bite that led to an emergency room visit where she was diagnosed with cellulitis. She was advised to consult an orthopedic surgeon but did not follow this advice. She still has a few marks from the incident, but her hand function is normal.    She has received her influenza vaccine and is considering another COVID-19 booster. She has not had any adverse reactions to either vaccine.    She is due for an eye examination on December 18, 2024.       No opioid medication identified on active medication list. I have reviewed chart for other potential  high risk medication/s and harmful drug interactions in the elderly.       Objective   Vital Signs:  /64 (BP Location: Left arm, Patient Position: Sitting, Cuff Size: Adult)   Pulse 74   Temp 98.6 °F (37 °C) (Infrared)   Ht 153 cm (60.24\")   Wt 66 kg (145 lb 9.6 oz)   BMI 28.21 kg/m²   Physical Exam  Vitals reviewed.   Constitutional:       Appearance: Normal appearance. She is well-developed.   HENT:      Head: Normocephalic and atraumatic.      Right Ear: Tympanic membrane and ear canal normal.      Left Ear: Tympanic membrane and ear canal normal.      Mouth/Throat:      Pharynx: Oropharynx is clear. No posterior oropharyngeal erythema.   Eyes:      Extraocular Movements: Extraocular movements intact.      Pupils: Pupils are equal, round, and reactive to light.   Neck:      Thyroid: No thyromegaly.      Vascular: No carotid bruit.   Cardiovascular:      Rate and Rhythm: Normal rate and regular rhythm.      Pulses:           Dorsalis pedis pulses are 1+ on the right side and 1+ on the left side.      " Heart sounds: Normal heart sounds. No murmur heard.     No friction rub. No gallop.   Pulmonary:      Effort: Pulmonary effort is normal.      Breath sounds: Normal breath sounds.   Abdominal:      General: Bowel sounds are normal.      Palpations: Abdomen is soft.      Tenderness: There is no abdominal tenderness.   Musculoskeletal:      Cervical back: Normal range of motion and neck supple.      Right lower leg: No edema.      Left lower leg: No edema.      Right foot: No deformity.      Left foot: No deformity.   Feet:      Right foot:      Protective Sensation: 5 sites tested.  5 sites sensed.      Skin integrity: Skin integrity normal.      Left foot:      Protective Sensation: 5 sites tested.  5 sites sensed.      Skin integrity: Skin integrity normal.      Comments: Sensation in both feet intact to monofilament.     Diabetic Foot Exam Performed and Monofilament Test Performed    Lymphadenopathy:      Cervical: No cervical adenopathy.   Skin:     General: Skin is warm and dry.   Neurological:      Mental Status: She is alert and oriented to person, place, and time.      Cranial Nerves: No cranial nerve deficit.      Motor: No weakness.      Gait: Gait normal.   Psychiatric:         Mood and Affect: Mood normal.         Behavior: Behavior normal.                       Results  Laboratory Studies  A1c is 7.1. Triglycerides were up. LDL is normal. APO B is borderline.              Assessment and Plan Additional age appropriate preventative wellness advice topics were discussed during today's preventative wellness exam(some topics already addressed during AWV portion of the note above):    Physical Activity: Advised cardiovascular activity 150 minutes per week as tolerated. (example brisk walk for 30 minutes, 5 days a week).     Nutrition: Discussed nutrition plan with patient. Information shared in after visit summary. Goal is for a well balanced diet to enhance overall health.     Healthy Weight: Discussed  current and goal BMI with patient. Steps to attain this goal discussed. Information shared in after visit summary.     Injury Prevention Discussion:  Information shared in after visit summary.             1. Health Maintenance.  Overall, she is doing well, although she had a scare when she had a breast biopsy. This was negative for malignancy. She is up to date on colorectal cancer screening. She is due for an eye exam on December 18, 2024. DEXA is due next year.    2. Diabetes Mellitus.  A1c is improved at 7.1%. She is encouraged to continue regular exercise and a healthy diet. She will continue metformin, glimepiride, and Farxiga.    3. Hyperlipidemia.  Triglycerides remain elevated. LDL is normal. ApoB is borderline. Continue rosuvastatin.    4. Hypertension.  Blood pressure is controlled on lisinopril.    5. Osteopenia.  Last DEXA showed osteopenia. Repeat next year.    Follow-up  Return in 6 months for follow up.            Follow Up   Return in about 6 months (around 6/2/2025) for follow up.  Patient was given instructions and counseling regarding her condition or for health maintenance advice. Please see specific information pulled into the AVS if appropriate.  Patient or patient representative verbalized consent for the use of Ambient Listening during the visit with  Jer Richardson MD for chart documentation. 12/5/2024  09:15 EST

## 2025-04-28 RX ORDER — GLIMEPIRIDE 4 MG/1
4 TABLET ORAL EVERY 12 HOURS SCHEDULED
Qty: 180 TABLET | Refills: 3 | Status: SHIPPED | OUTPATIENT
Start: 2025-04-28

## 2025-04-28 RX ORDER — LISINOPRIL 20 MG/1
20 TABLET ORAL DAILY
Qty: 90 TABLET | Refills: 3 | Status: SHIPPED | OUTPATIENT
Start: 2025-04-28

## 2025-06-04 ENCOUNTER — LAB (OUTPATIENT)
Dept: LAB | Facility: HOSPITAL | Age: 70
End: 2025-06-04
Payer: MEDICARE

## 2025-06-04 ENCOUNTER — OFFICE VISIT (OUTPATIENT)
Dept: INTERNAL MEDICINE | Facility: CLINIC | Age: 70
End: 2025-06-04
Payer: MEDICARE

## 2025-06-04 VITALS
BODY MASS INDEX: 28.03 KG/M2 | TEMPERATURE: 98.4 F | HEIGHT: 60 IN | SYSTOLIC BLOOD PRESSURE: 114 MMHG | WEIGHT: 142.8 LBS | OXYGEN SATURATION: 99 % | HEART RATE: 77 BPM | DIASTOLIC BLOOD PRESSURE: 64 MMHG

## 2025-06-04 DIAGNOSIS — I10 ESSENTIAL HYPERTENSION: ICD-10-CM

## 2025-06-04 DIAGNOSIS — E11.42 TYPE 2 DIABETES MELLITUS WITH DIABETIC POLYNEUROPATHY, WITHOUT LONG-TERM CURRENT USE OF INSULIN: ICD-10-CM

## 2025-06-04 DIAGNOSIS — E78.2 MIXED HYPERLIPIDEMIA: ICD-10-CM

## 2025-06-04 DIAGNOSIS — E11.42 TYPE 2 DIABETES MELLITUS WITH DIABETIC POLYNEUROPATHY, WITHOUT LONG-TERM CURRENT USE OF INSULIN: Primary | ICD-10-CM

## 2025-06-04 LAB
ALBUMIN SERPL-MCNC: 4.9 G/DL (ref 3.5–5.2)
ALBUMIN/GLOB SERPL: 1.8 G/DL
ALP SERPL-CCNC: 63 U/L (ref 39–117)
ALT SERPL W P-5'-P-CCNC: 12 U/L (ref 1–33)
ANION GAP SERPL CALCULATED.3IONS-SCNC: 14.2 MMOL/L (ref 5–15)
AST SERPL-CCNC: 17 U/L (ref 1–32)
BILIRUB SERPL-MCNC: 0.5 MG/DL (ref 0–1.2)
BUN SERPL-MCNC: 17 MG/DL (ref 8–23)
BUN/CREAT SERPL: 23.6 (ref 7–25)
CALCIUM SPEC-SCNC: 10.1 MG/DL (ref 8.6–10.5)
CHLORIDE SERPL-SCNC: 97 MMOL/L (ref 98–107)
CHOLEST SERPL-MCNC: 145 MG/DL (ref 0–200)
CO2 SERPL-SCNC: 26.8 MMOL/L (ref 22–29)
CREAT SERPL-MCNC: 0.72 MG/DL (ref 0.57–1)
EGFRCR SERPLBLD CKD-EPI 2021: 91.2 ML/MIN/1.73
GLOBULIN UR ELPH-MCNC: 2.8 GM/DL
GLUCOSE SERPL-MCNC: 125 MG/DL (ref 65–99)
HBA1C MFR BLD: 7.1 % (ref 4.8–5.6)
HDLC SERPL-MCNC: 46 MG/DL (ref 40–60)
LDLC SERPL CALC-MCNC: 57 MG/DL (ref 0–100)
LDLC/HDLC SERPL: 0.99 {RATIO}
POTASSIUM SERPL-SCNC: 4.5 MMOL/L (ref 3.5–5.2)
PROT SERPL-MCNC: 7.7 G/DL (ref 6–8.5)
SODIUM SERPL-SCNC: 138 MMOL/L (ref 136–145)
TRIGL SERPL-MCNC: 267 MG/DL (ref 0–150)
VLDLC SERPL-MCNC: 42 MG/DL (ref 5–40)

## 2025-06-04 PROCEDURE — 80061 LIPID PANEL: CPT

## 2025-06-04 PROCEDURE — 36415 COLL VENOUS BLD VENIPUNCTURE: CPT

## 2025-06-04 PROCEDURE — 80053 COMPREHEN METABOLIC PANEL: CPT

## 2025-06-04 PROCEDURE — 83036 HEMOGLOBIN GLYCOSYLATED A1C: CPT

## 2025-06-04 PROCEDURE — 82172 ASSAY OF APOLIPOPROTEIN: CPT

## 2025-06-04 NOTE — PROGRESS NOTES
Answers submitted by the patient for this visit:  Problem not listed (Submitted on 5/29/2025)  Chief Complaint: Other medical problem  Reason for appointment: Follow up to annual physical because of diabetes  [facility]     Rachel Dale  1955   [id number]      Patient Care Team:  Jer Richardson MD as PCP - General (Internal Medicine)    Chief Complaint::   Chief Complaint   Patient presents with    Primary Care Follow-Up    Diabetes    Hyperlipidemia        HPI  History of Present Illness  The patient comes in for follow-up of diabetes, hypertension, and hyperlipidemia.    She reports no new health concerns and maintains an active lifestyle, particularly since her FCI in 2014. She has been engaged in household chores, which she perceives as a form of physical exercise. Her weight has decreased from 150 pounds in 09/2024 to 142 pounds today. She attributes this weight loss to eating less, influenced by her roommate who is on Ozempic.    Her A1c has improved from 8.1% to 7.1% since the last visit.      Chronic Conditions:      Patient Active Problem List   Diagnosis    Allergic rhinitis    Diabetes mellitus, type 2    Peripheral neuropathy    Idiopathic peripheral neuropathy    Mixed hyperlipidemia    Obesity    Osteoporosis    Vitamin D deficiency    Essential hypertension        Past Medical History:   Diagnosis Date    Cataract 2015    Removed    Cholelithiasis     Removed    Diabetes mellitus     Hypertension     Obesity     Osteopenia     Last year or prior year       Past Surgical History:   Procedure Laterality Date    BREAST SURGERY      BREAST REDUCTION     CHOLECYSTECTOMY  2009    COLONOSCOPY  2022    EYE SURGERY  2015    Cataract       Family History   Problem Relation Age of Onset    Hypertension Mother     Stroke Father     Colon cancer Brother 74    Breast cancer Maternal Aunt     Coronary artery disease Maternal Grandfather        Social History     Socioeconomic  History    Marital status:    Tobacco Use    Smoking status: Never    Smokeless tobacco: Never   Vaping Use    Vaping status: Never Used   Substance and Sexual Activity    Alcohol use: Not Currently     Comment: 1 glass of wine a year    Drug use: Never    Sexual activity: Not Currently     Partners: Male       Allergies   Allergen Reactions    Sulfa Antibiotics Other (See Comments)     OTHER         Current Outpatient Medications:     Acetaminophen (TYLENOL ARTHRITIS PAIN PO), Take 2 tablets by mouth 2 (Two) Times a Day As Needed., Disp: , Rfl:     aspirin 81 MG EC tablet, Take 1 tablet by mouth Daily., Disp: , Rfl:     calcium carbonate (OS-ALTAGRACIA) 600 MG tablet, Take 1 tablet by mouth Daily., Disp: , Rfl:     cetirizine (zyrTEC) 10 MG tablet, Take 1 tablet by mouth Daily., Disp: , Rfl:     Farxiga 10 MG tablet, TAKE 1 TABLET BY MOUTH DAILY, Disp: 90 tablet, Rfl: 3    glimepiride (AMARYL) 4 MG tablet, TAKE 1 TABLET BY MOUTH TWICE A DAY, Disp: 180 tablet, Rfl: 3    lisinopril (PRINIVIL,ZESTRIL) 20 MG tablet, TAKE 1 TABLET BY MOUTH EVERY DAY, Disp: 90 tablet, Rfl: 3    Lutein-Zeaxanthin 25-5 MG capsule, Take 1 capsule by mouth Daily., Disp: , Rfl:     metFORMIN (GLUCOPHAGE) 1000 MG tablet, TAKE 1 TABLET BY MOUTH TWICE A DAY WITH MEALS, Disp: 180 tablet, Rfl: 3    Omega-3 Fatty Acids (FISH OIL ULTRA) 1400 MG capsule, Take 1 capsule by mouth Daily., Disp: , Rfl:     omeprazole (priLOSEC) 20 MG capsule, Take 1 capsule by mouth Every Other Day., Disp: , Rfl:     rosuvastatin (CRESTOR) 10 MG tablet, TAKE 1 TABLET BY MOUTH EVERY DAY, Disp: 90 tablet, Rfl: 3    Vitamin D, Ergocalciferol, 2000 units capsule, Take 1 capsule by mouth Daily., Disp: , Rfl:     vitamin E 400 UNIT capsule, Take 1 capsule by mouth Daily., Disp: , Rfl:     Review of Systems   Constitutional: Negative.    Respiratory: Negative.  Negative for chest tightness and shortness of breath.    Cardiovascular: Negative.  Negative for chest pain.  "  Gastrointestinal:  Negative for abdominal pain, blood in stool, constipation and diarrhea.        Vital Signs  Vitals:    06/04/25 0922   BP: 114/64   BP Location: Left arm   Patient Position: Sitting   Cuff Size: Adult   Pulse: 77   Temp: 98.4 °F (36.9 °C)   TempSrc: Infrared   SpO2: 99%   Weight: 64.8 kg (142 lb 12.8 oz)   Height: 153 cm (60.24\")   PainSc: 2    PainLoc: Shoulder  Comment: Left       Physical Exam  Vitals reviewed.   Constitutional:       Appearance: Normal appearance. She is well-developed.   HENT:      Head: Normocephalic and atraumatic.   Neck:      Thyroid: No thyromegaly.      Vascular: No carotid bruit.   Cardiovascular:      Rate and Rhythm: Normal rate and regular rhythm.      Heart sounds: Normal heart sounds. No murmur heard.     No friction rub. No gallop.   Pulmonary:      Effort: Pulmonary effort is normal.      Breath sounds: Normal breath sounds.   Musculoskeletal:      Cervical back: Normal range of motion and neck supple.      Right lower leg: No edema.      Left lower leg: No edema.   Lymphadenopathy:      Cervical: No cervical adenopathy.   Skin:     General: Skin is warm and dry.   Neurological:      Mental Status: She is alert and oriented to person, place, and time.      Cranial Nerves: No cranial nerve deficit.   Psychiatric:         Mood and Affect: Mood normal.         Behavior: Behavior normal.        Physical Exam      Procedures    ACE III MINI        Results  Labs   - A1c: 7.1           Assessment/Plan:    Diagnoses and all orders for this visit:    1. Type 2 diabetes mellitus with diabetic polyneuropathy, without long-term current use of insulin (Primary)  -     Comprehensive Metabolic Panel; Future  -     Hemoglobin A1c; Future    2. Essential hypertension    3. Mixed hyperlipidemia  -     Lipid Panel; Future  -     Apolipoprotein B; Future      Assessment & Plan  1. Diabetes Mellitus.  - Weight has decreased from 150 lbs in 09/2024 to 142 lbs today.  - Previous A1c " was 7.1, down from 8.1; current A1c is pending.  - Discussion on maintaining A1c at acceptable levels; potential addition of medication if A1c increases.  - Continue glimepiride, metformin, and Farxiga.    2. Hypertension.  - Blood pressure is well controlled.  - Effective management with lisinopril.  - No changes in medication or treatment plan.  - Continue current regimen.    3. Hyperlipidemia.  - Lipid panel is pending.  - Emphasis on maintaining a healthy diet.  - Continue rosuvastatin.  - Monitoring lipid levels for future assessment.    Follow-up  - Follow-up appointment scheduled in 6 months.      Plan of care reviewed with patient at the conclusion of today's visit. Education was provided regarding diagnosis, management, and any prescribed or recommended OTC medications.Patient verbalizes understanding of and agreement with management plan.     Patient or patient representative verbalized consent for the use of Ambient Listening during the visit with  Jer iRchardson MD for chart documentation. 6/6/2025  09:16 EDT        Jer Richardson MD

## 2025-06-07 LAB — APO B SERPL-MCNC: 77 MG/DL

## 2025-06-30 ENCOUNTER — RESULTS FOLLOW-UP (OUTPATIENT)
Dept: INTERNAL MEDICINE | Facility: CLINIC | Age: 70
End: 2025-06-30
Payer: MEDICARE

## 2025-07-28 RX ORDER — ROSUVASTATIN CALCIUM 10 MG/1
10 TABLET, COATED ORAL DAILY
Qty: 90 TABLET | Refills: 3 | Status: SHIPPED | OUTPATIENT
Start: 2025-07-28